# Patient Record
Sex: FEMALE | Race: BLACK OR AFRICAN AMERICAN | NOT HISPANIC OR LATINO | Employment: OTHER | URBAN - METROPOLITAN AREA
[De-identification: names, ages, dates, MRNs, and addresses within clinical notes are randomized per-mention and may not be internally consistent; named-entity substitution may affect disease eponyms.]

---

## 2018-07-24 PROBLEM — H40.053 OCULAR HYPERTENSION, BILATERAL: Status: ACTIVE | Noted: 2018-07-24

## 2018-12-12 ENCOUNTER — OUTSIDE PLACE OF SERVICE (OUTPATIENT)
Dept: CARDIOLOGY | Facility: CLINIC | Age: 68
End: 2018-12-12
Payer: MEDICARE

## 2018-12-12 PROCEDURE — 93010 ELECTROCARDIOGRAM REPORT: CPT | Mod: S$GLB,,, | Performed by: INTERNAL MEDICINE

## 2019-06-20 ENCOUNTER — TELEPHONE (OUTPATIENT)
Dept: ADMINISTRATIVE | Facility: HOSPITAL | Age: 69
End: 2019-06-20

## 2019-11-27 ENCOUNTER — HOSPITAL ENCOUNTER (EMERGENCY)
Facility: HOSPITAL | Age: 69
Discharge: HOME | End: 2019-11-27
Attending: SURGERY
Payer: MEDICAID

## 2019-11-27 VITALS
RESPIRATION RATE: 18 BRPM | BODY MASS INDEX: 29.16 KG/M2 | HEART RATE: 75 BPM | TEMPERATURE: 98 F | SYSTOLIC BLOOD PRESSURE: 140 MMHG | HEIGHT: 65 IN | WEIGHT: 175 LBS | OXYGEN SATURATION: 98 % | DIASTOLIC BLOOD PRESSURE: 89 MMHG
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
## 2019-11-27 DIAGNOSIS — S93.401A SPRAIN OF RIGHT ANKLE, UNSPECIFIED LIGAMENT, INITIAL ENCOUNTER: ICD-10-CM

## 2019-11-27 DIAGNOSIS — S83.91XA SPRAIN OF RIGHT KNEE, UNSPECIFIED LIGAMENT, INITIAL ENCOUNTER: Primary | ICD-10-CM

## 2019-11-27 PROCEDURE — 99283 EMERGENCY DEPT VISIT LOW MDM: CPT | Mod: 25,ER

## 2019-11-27 NOTE — ED PROVIDER NOTES
Encounter Date: 2019       History     Chief Complaint   Patient presents with    Fall     Pt tripped over walker and fell on ceramic tile. c/o right ankle, knee, elbow, and back pain. Also c/o headache.      69-year-old female presents to the emergency department for evaluation of acute left ankle and right knee pain status post mechanical trip and fall.  She reports that she was holding her 8-month-old grandchild when she tripped over 1 of the child's toy is an fell.  She reports that she fell onto ceramic tile.  She reports that she twisted her left ankle, landed on her right knee in hit the right side of her body upon impact.  She reports that she did not hit her head nor lose consciousness.  She denies any headache, dizziness, vision changes, neck pain, chest pain, abdominal pain, nausea, vomiting or diarrhea.  No treatment was attempted prior to arrival.  She reports that she was currently placed on Mobic for shoulder injury status post trip and fall 2 months ago.  She reports that she is on aspirin but no other blood thinning medications.        Review of patient's allergies indicates:   Allergen Reactions    Codeine sulfate Hives    Darvocet a500 [propoxyphene n-acetaminophen] Other (See Comments)    Darvon [propoxyphene] Other (See Comments)     hallucinations    Sulfa (sulfonamide antibiotics) Hives     Past Medical History:   Diagnosis Date    Arthritis     Cataract     Dry eye     Hypertension      Past Surgical History:   Procedure Laterality Date    ANTERIOR CRUCIATE LIGAMENT REPAIR      APPENDECTOMY      CATARACT EXTRACTION Right      SECTION      HYSTERECTOMY  age 36    d/t uterine fibroids.    KNEE ARTHROSCOPY Right 06/15/2017    per pt    OOPHORECTOMY Right      Family History   Problem Relation Age of Onset    Arthritis Mother     Diabetes Mother     Heart disease Mother     Hyperlipidemia Mother     Cancer Father         Colon    Diabetes Sister     Heart  disease Sister     Hyperlipidemia Sister      Social History     Tobacco Use    Smoking status: Never Smoker    Smokeless tobacco: Never Used   Substance Use Topics    Alcohol use: No     Alcohol/week: 0.0 standard drinks    Drug use: No     Review of Systems   Constitutional: Negative for activity change, chills and fever.   HENT: Negative for congestion, rhinorrhea, sinus pain, sore throat, trouble swallowing and voice change.    Eyes: Negative for photophobia and visual disturbance.   Respiratory: Negative for cough and shortness of breath.    Cardiovascular: Negative for chest pain.   Gastrointestinal: Negative for abdominal pain, constipation, diarrhea, nausea and vomiting.   Genitourinary: Negative for decreased urine volume, dysuria, frequency and hematuria.   Musculoskeletal: Positive for arthralgias. Negative for back pain, joint swelling, neck pain and neck stiffness.   Skin: Negative for rash.   Neurological: Negative for dizziness, syncope, weakness, light-headedness, numbness and headaches.       Physical Exam     Initial Vitals [11/27/19 1532]   BP Pulse Resp Temp SpO2   (!) 140/89 75 18 98.3 °F (36.8 °C) 98 %      MAP       --         Physical Exam    Nursing note and vitals reviewed.  Constitutional: She appears well-developed and well-nourished. She is not diaphoretic. No distress.   HENT:   Head: Normocephalic and atraumatic.   Right Ear: External ear normal.   Left Ear: External ear normal.   Nose: Nose normal.   Mouth/Throat: Oropharynx is clear and moist.   Eyes: Conjunctivae and EOM are normal. Pupils are equal, round, and reactive to light.   Neck: Normal range of motion. Neck supple.   Cardiovascular: Normal rate, regular rhythm and normal heart sounds.   Pulmonary/Chest: Breath sounds normal. No respiratory distress. She has no wheezes. She has no rhonchi. She has no rales. She exhibits no tenderness.   Abdominal: Soft. There is no tenderness.   Musculoskeletal:        Right hip: She  exhibits normal range of motion, no tenderness and no bony tenderness.        Left hip: She exhibits normal range of motion, no tenderness and no bony tenderness.        Right knee: She exhibits bony tenderness. She exhibits normal range of motion, no swelling and no effusion. Tenderness found.        Left knee: She exhibits normal range of motion, no swelling, no effusion and no bony tenderness. No tenderness found.        Right ankle: She exhibits normal range of motion, no swelling and no ecchymosis.        Left ankle: She exhibits normal range of motion, no swelling and no ecchymosis. Tenderness. Lateral malleolus tenderness found.   Neurological: She is alert and oriented to person, place, and time.   Skin: Skin is warm and dry.   Psychiatric: She has a normal mood and affect.         ED Course   Procedures  Labs Reviewed - No data to display       Imaging Results          X-Ray Ankle Complete Left (Final result)  Result time 11/27/19 16:11:04    Final result by Aron Garrido III, MD (11/27/19 16:11:04)                 Impression:      No acute osseous abnormality identified.      Electronically signed by: Aron Garrido MD  Date:    11/27/2019  Time:    16:11             Narrative:    EXAMINATION:  XR ANKLE COMPLETE 3 VIEW LEFT    CLINICAL HISTORY:  Fall on same level from slipping, tripping and stumbling without subsequent striking against object, initial encounter; left ankle injury    FINDINGS:  No fracture is identified.  Joint alignment is anatomic.  No severe arthritic change identified.  Calcaneal tuberosity enthesophytes are noted.  There are several calcified phleboliths in the soft tissues of the ankle and lower leg.  There is mild soft tissue swelling in the ankle.                               X-Ray Knee 1 or 2 View Right (Final result)  Result time 11/27/19 16:11:58    Final result by Lefty Montgomery Jr., MD (11/27/19 16:11:58)                 Impression:      No acute findings.  Osteoarthritis  most severe medially with near bone-on-bone articulation.      Electronically signed by: Lefty Montgomery Jr., MD  Date:    11/27/2019  Time:    16:11             Narrative:    EXAMINATION:  XR KNEE 1 OR 2 VIEW RIGHT    CLINICAL HISTORY:  Fall on same level from slipping, tripping and stumbling without subsequent striking against object, initial encounter    TECHNIQUE:  AP and lateral views of the right knee were performed.    COMPARISON:  None    FINDINGS:  Normal alignment with no acute fracture.  Tricompartmental degenerative joint space narrowing most pronounced medially with near bone-on-bone articulation.  Large marginal osteophytes.  There are also osteophytes at the margins of the patella.  Small suprapatellar effusion.                                 Medical Decision Making:   Initial Assessment:   69-year-old female presents to the emergency department for left ankle and right knee pain status post mechanical trip and fall.  Physical exam reveals a nontoxic-appearing female in no acute distress. Patient is afebrile vital signs within normal limits. Neurological exam reveals an alert and oriented patient.  No evidence of head injury noted.  Neck is supple, nontender to palpation.  Examination of the right lower extremity reveals mild tenderness to palpation noted over the anterior aspect of the right knee.  Examination of the left lower extremity reveals mild tenderness to palpation noted over the lateral malleolus of the left ankle.  No erythema, edema or ecchymosis noted.  Full range of motion, sensation and peripheral pulses intact in lower extremities bilaterally.  Differential Diagnosis:   X-ray of the knee and ankle ordered to assess possible osseous injury including fracture or dislocation  Knee sprain  Ankle sprain    ED Management:  X-ray report of the ankle reveals no acute .  X-ray report of the reveals no acute findings.  These findings were discussed with the patient verbalizes understanding and  agreement course treatment.  Instructed the patient to follow up with her primary care provider for re-evaluation and to return to the emergency department immediately for any new or worsening symptoms.                                 Clinical Impression:       ICD-10-CM ICD-9-CM   1. Sprain of right knee, unspecified ligament, initial encounter S83.91XA 844.9   2. Fall from slip, trip, or stumble, initial encounter W01.0XXA E885.9   3. Sprain of right ankle, unspecified ligament, initial encounter S93.401A 845.00                             Barbara Bernardo PA-C  11/27/19 9481

## 2019-11-27 NOTE — DISCHARGE INSTRUCTIONS
The x-rays of your knee and ankle did not reveal any evidence of fracture or dislocation.  you are advised to rest, elevate and ice the joints.  You are instructed to follow up with your primary care provider for re-evaluation and to return to the emergency department immediately for any new or worsening symptoms

## 2021-07-01 ENCOUNTER — PATIENT MESSAGE (OUTPATIENT)
Dept: ADMINISTRATIVE | Facility: OTHER | Age: 71
End: 2021-07-01

## 2022-04-28 ENCOUNTER — OUTSIDE PLACE OF SERVICE (OUTPATIENT)
Dept: CARDIOLOGY | Facility: CLINIC | Age: 72
End: 2022-04-28
Payer: MEDICAID

## 2022-04-28 PROCEDURE — 93010 PR ELECTROCARDIOGRAM REPORT: ICD-10-PCS | Mod: ,,, | Performed by: INTERNAL MEDICINE

## 2022-04-28 PROCEDURE — 93010 ELECTROCARDIOGRAM REPORT: CPT | Mod: ,,, | Performed by: INTERNAL MEDICINE

## 2022-09-30 RX ORDER — DIAZEPAM 5 MG/1
5 TABLET ORAL
Qty: 1 TABLET | Refills: 0 | Status: SHIPPED | OUTPATIENT
Start: 2022-09-30 | End: 2023-02-16

## 2023-03-14 DIAGNOSIS — Z12.31 ENCOUNTER FOR SCREENING MAMMOGRAM FOR BREAST CANCER: Primary | ICD-10-CM

## 2023-04-03 ENCOUNTER — PATIENT MESSAGE (OUTPATIENT)
Dept: ADMINISTRATIVE | Facility: HOSPITAL | Age: 73
End: 2023-04-03
Payer: MEDICAID

## 2023-05-16 ENCOUNTER — PATIENT OUTREACH (OUTPATIENT)
Dept: ADMINISTRATIVE | Facility: HOSPITAL | Age: 73
End: 2023-05-16
Payer: MEDICAID

## 2023-05-18 ENCOUNTER — PATIENT OUTREACH (OUTPATIENT)
Dept: ADMINISTRATIVE | Facility: HOSPITAL | Age: 73
End: 2023-05-18
Payer: MEDICAID

## 2023-07-25 PROBLEM — I45.10 RIGHT BUNDLE BRANCH BLOCK: Status: ACTIVE | Noted: 2023-07-25

## 2023-07-25 PROBLEM — R07.89 CHEST PAIN, ATYPICAL: Status: ACTIVE | Noted: 2023-07-25

## 2023-07-25 PROBLEM — Z82.49 FAMILY HISTORY OF CORONARY ARTERY DISEASE: Status: ACTIVE | Noted: 2023-07-25

## 2023-10-17 ENCOUNTER — PATIENT OUTREACH (OUTPATIENT)
Dept: ADMINISTRATIVE | Facility: HOSPITAL | Age: 73
End: 2023-10-17
Payer: MEDICARE

## 2023-10-17 NOTE — PROGRESS NOTES
Attempted to contact pt in reference to the BP gap report. Unable to contact. No answer message left.

## 2023-12-18 ENCOUNTER — PATIENT MESSAGE (OUTPATIENT)
Dept: ADMINISTRATIVE | Facility: HOSPITAL | Age: 73
End: 2023-12-18
Payer: MEDICAID

## 2023-12-19 ENCOUNTER — HOSPITAL ENCOUNTER (EMERGENCY)
Facility: HOSPITAL | Age: 73
Discharge: HOME | End: 2023-12-19
Attending: EMERGENCY MEDICINE
Payer: MEDICAID

## 2023-12-19 VITALS
WEIGHT: 170 LBS | OXYGEN SATURATION: 100 % | HEART RATE: 72 BPM | SYSTOLIC BLOOD PRESSURE: 167 MMHG | RESPIRATION RATE: 18 BRPM | BODY MASS INDEX: 28.29 KG/M2 | DIASTOLIC BLOOD PRESSURE: 85 MMHG | TEMPERATURE: 99 F

## 2023-12-19 DIAGNOSIS — J06.9 UPPER RESPIRATORY INFECTION, VIRAL: Primary | ICD-10-CM

## 2023-12-19 DIAGNOSIS — M94.0 COSTOCHONDRITIS, ACUTE: ICD-10-CM

## 2023-12-19 LAB
ANION GAP SERPL CALC-SCNC: 12 MMOL/L (ref 8–16)
BASOPHILS # BLD AUTO: 0.05 K/UL (ref 0–0.2)
BASOPHILS NFR BLD: 0.8 % (ref 0–1.9)
CALCIUM SERPL-MCNC: 9.2 MG/DL (ref 8.7–10.5)
CHLORIDE SERPL-SCNC: 100 MMOL/L (ref 95–110)
CO2 SERPL-SCNC: 27 MMOL/L (ref 23–29)
CREAT SERPL-MCNC: 1.01 MG/DL (ref 0.5–1.4)
DIFFERENTIAL METHOD: ABNORMAL
EOSINOPHIL # BLD AUTO: 0 K/UL (ref 0–0.5)
EOSINOPHIL NFR BLD: 0.2 % (ref 0–8)
ERYTHROCYTE [DISTWIDTH] IN BLOOD BY AUTOMATED COUNT: 13.4 % (ref 11.5–14.5)
EST. GFR  (NO RACE VARIABLE): 59.5 ML/MIN/1.73 M^2
GLUCOSE SERPL-MCNC: 106 MG/DL (ref 70–110)
GROUP A STREP, MOLECULAR: NEGATIVE
HCT VFR BLD AUTO: 43.8 % (ref 37–48.5)
HGB BLD-MCNC: 14.7 G/DL (ref 12–16)
IMM GRANULOCYTES # BLD AUTO: 0.04 K/UL (ref 0–0.04)
IMM GRANULOCYTES NFR BLD AUTO: 0.6 % (ref 0–0.5)
INFLUENZA A, MOLECULAR: NEGATIVE
INFLUENZA B, MOLECULAR: NEGATIVE
LYMPHOCYTES # BLD AUTO: 1.6 K/UL (ref 1–4.8)
LYMPHOCYTES NFR BLD: 25.4 % (ref 18–48)
MCH RBC QN AUTO: 28.6 PG (ref 27–31)
MCHC RBC AUTO-ENTMCNC: 33.6 G/DL (ref 32–36)
MCV RBC AUTO: 85 FL (ref 82–98)
MONOCYTES # BLD AUTO: 1.2 K/UL (ref 0.3–1)
MONOCYTES NFR BLD: 19 % (ref 4–15)
NEUTROPHILS # BLD AUTO: 3.4 K/UL (ref 1.8–7.7)
NEUTROPHILS NFR BLD: 54 % (ref 38–73)
NRBC BLD-RTO: 0 /100 WBC
PLATELET # BLD AUTO: 293 K/UL (ref 150–450)
PMV BLD AUTO: 9.8 FL (ref 9.2–12.9)
POTASSIUM SERPL-SCNC: 3.7 MMOL/L (ref 3.5–5.1)
RBC # BLD AUTO: 5.14 M/UL (ref 4–5.4)
SARS-COV-2 RDRP RESP QL NAA+PROBE: NEGATIVE
SODIUM SERPL-SCNC: 139 MMOL/L (ref 136–145)
SPECIMEN SOURCE: NORMAL
TROPONIN I SERPL-MCNC: 0.01 NG/ML (ref 0.01–0.03)
UUN UR-MCNC: 15 MG/DL (ref 7–17)
WBC # BLD AUTO: 6.37 K/UL (ref 3.9–12.7)

## 2023-12-19 PROCEDURE — 84484 ASSAY OF TROPONIN QUANT: CPT | Mod: ER | Performed by: EMERGENCY MEDICINE

## 2023-12-19 PROCEDURE — 94640 AIRWAY INHALATION TREATMENT: CPT | Mod: ER

## 2023-12-19 PROCEDURE — 87502 INFLUENZA DNA AMP PROBE: CPT | Mod: ER | Performed by: EMERGENCY MEDICINE

## 2023-12-19 PROCEDURE — 85025 COMPLETE CBC W/AUTO DIFF WBC: CPT | Mod: ER | Performed by: EMERGENCY MEDICINE

## 2023-12-19 PROCEDURE — 25000003 PHARM REV CODE 250: Mod: ER | Performed by: EMERGENCY MEDICINE

## 2023-12-19 PROCEDURE — 87651 STREP A DNA AMP PROBE: CPT | Mod: ER | Performed by: EMERGENCY MEDICINE

## 2023-12-19 PROCEDURE — U0002 COVID-19 LAB TEST NON-CDC: HCPCS | Mod: ER | Performed by: EMERGENCY MEDICINE

## 2023-12-19 PROCEDURE — 93005 ELECTROCARDIOGRAM TRACING: CPT | Mod: ER

## 2023-12-19 PROCEDURE — 80048 BASIC METABOLIC PNL TOTAL CA: CPT | Mod: ER | Performed by: EMERGENCY MEDICINE

## 2023-12-19 PROCEDURE — 87502 INFLUENZA DNA AMP PROBE: CPT | Mod: ER

## 2023-12-19 PROCEDURE — 99285 EMERGENCY DEPT VISIT HI MDM: CPT | Mod: 25,ER

## 2023-12-19 PROCEDURE — 25000242 PHARM REV CODE 250 ALT 637 W/ HCPCS: Mod: ER | Performed by: EMERGENCY MEDICINE

## 2023-12-19 PROCEDURE — 63600175 PHARM REV CODE 636 W HCPCS: Mod: ER | Performed by: EMERGENCY MEDICINE

## 2023-12-19 PROCEDURE — 99900035 HC TECH TIME PER 15 MIN (STAT): Mod: ER

## 2023-12-19 PROCEDURE — 25500020 PHARM REV CODE 255: Mod: ER | Performed by: EMERGENCY MEDICINE

## 2023-12-19 PROCEDURE — 96374 THER/PROPH/DIAG INJ IV PUSH: CPT | Mod: ER

## 2023-12-19 RX ORDER — BENZONATATE 100 MG/1
200 CAPSULE ORAL
Status: COMPLETED | OUTPATIENT
Start: 2023-12-19 | End: 2023-12-19

## 2023-12-19 RX ORDER — DEXAMETHASONE SODIUM PHOSPHATE 4 MG/ML
4 INJECTION, SOLUTION INTRA-ARTICULAR; INTRALESIONAL; INTRAMUSCULAR; INTRAVENOUS; SOFT TISSUE
Status: COMPLETED | OUTPATIENT
Start: 2023-12-19 | End: 2023-12-19

## 2023-12-19 RX ORDER — PREDNISONE 20 MG/1
40 TABLET ORAL DAILY
Qty: 8 TABLET | Refills: 0 | Status: SHIPPED | OUTPATIENT
Start: 2023-12-19 | End: 2023-12-23

## 2023-12-19 RX ORDER — ALBUTEROL SULFATE 90 UG/1
1-2 AEROSOL, METERED RESPIRATORY (INHALATION) EVERY 6 HOURS PRN
Qty: 8 G | Refills: 0 | Status: SHIPPED | OUTPATIENT
Start: 2023-12-19 | End: 2024-12-18

## 2023-12-19 RX ORDER — IPRATROPIUM BROMIDE AND ALBUTEROL SULFATE 2.5; .5 MG/3ML; MG/3ML
3 SOLUTION RESPIRATORY (INHALATION)
Status: COMPLETED | OUTPATIENT
Start: 2023-12-19 | End: 2023-12-19

## 2023-12-19 RX ADMIN — IOHEXOL 100 ML: 350 INJECTION, SOLUTION INTRAVENOUS at 02:12

## 2023-12-19 RX ADMIN — BENZONATATE 200 MG: 100 CAPSULE ORAL at 12:12

## 2023-12-19 RX ADMIN — DEXAMETHASONE SODIUM PHOSPHATE 4 MG: 4 INJECTION, SOLUTION INTRA-ARTICULAR; INTRALESIONAL; INTRAMUSCULAR; INTRAVENOUS; SOFT TISSUE at 12:12

## 2023-12-19 RX ADMIN — IPRATROPIUM BROMIDE AND ALBUTEROL SULFATE 3 ML: .5; 3 SOLUTION RESPIRATORY (INHALATION) at 12:12

## 2023-12-19 NOTE — ED PROVIDER NOTES
Chief Complaint:  Cough, congestion, body aches, shortness of breath and chest pain    History of Present Illness:    [place] 73 y.o. with a  has a past medical history of Arthritis, Cataract, Dry eye, and Hypertension. who presents to the emergency department today with a complaint of Cough, congestion, body aches, shortness of breath and chest pain.  Patient reports that this has been ongoing since Saturday.  She has chest pain when she coughs.  She gets short of breath when she coughs.  She denies any sputum production.  She has all over body aches.  Slight sore throat.  No headache.  No vomiting.  No diarrhea.        ROS    Constitutional: No fever, no chills.  Gastrointestinal: No abdominal pain, no vomiting. No diarrhea.  Genitourinary: No hematuria, dysuria, urgency.  Musculoskeletal: No back pain.     Otherwise remaining ROS negative     The history is provided by the patient      Reviewed and verified by myself:   PMH/PSH/SOC/FH REVIEWED :    Past Medical History:   Diagnosis Date    Arthritis     Cataract     Dry eye     Hypertension        Past Surgical History:   Procedure Laterality Date    ANTERIOR CRUCIATE LIGAMENT REPAIR      APPENDECTOMY      CATARACT EXTRACTION Right      SECTION      HYSTERECTOMY  age 36    d/t uterine fibroids.    KNEE ARTHROSCOPY Right 06/15/2017    per pt    OOPHORECTOMY Right        Social History     Socioeconomic History    Marital status:     Years of education: 15   Tobacco Use    Smoking status: Never    Smokeless tobacco: Never   Substance and Sexual Activity    Alcohol use: No     Alcohol/week: 0.0 standard drinks of alcohol    Drug use: No    Sexual activity: Never       Family History   Problem Relation Age of Onset    Arthritis Mother     Diabetes Mother     Heart disease Mother     Hyperlipidemia Mother     Cancer Father         Colon    Diabetes Sister     Heart disease Sister     Hyperlipidemia Sister                ALLERGIES  REVIEWED  Review of patient's allergies indicates:   Allergen Reactions    Codeine sulfate Hives    Darvocet a500 [propoxyphene n-acetaminophen] Other (See Comments)    Darvon [propoxyphene] Other (See Comments)     hallucinations    Sulfa (sulfonamide antibiotics) Hives    Sulfamethoxazole-trimethoprim        MEDICATIONS REVIEWED  Medication List with Changes/Refills   New Medications    ALBUTEROL (PROVENTIL/VENTOLIN HFA) 90 MCG/ACTUATION INHALER    Inhale 1-2 puffs into the lungs every 6 (six) hours as needed for Wheezing or Shortness of Breath. Rescue    PREDNISONE (DELTASONE) 20 MG TABLET    Take 2 tablets (40 mg total) by mouth once daily. for 4 days   Current Medications    ASPIRIN (ECOTRIN) 81 MG EC TABLET    Take 81 mg by mouth once daily.    LATANOPROST (XALATAN) 0.005 % OPHTHALMIC SOLUTION    Place 1 drop into both eyes once daily.    LEVOTHYROXINE (EUTHYROX) 75 MCG TABLET    Take 1 tablet (75 mcg total) by mouth before breakfast.    LISINOPRIL-HYDROCHLOROTHIAZIDE (PRINZIDE,ZESTORETIC) 20-12.5 MG PER TABLET    Take 1 tablet by mouth once daily.    METOPROLOL TARTRATE (LOPRESSOR) 50 MG TABLET    Take 1 tablet (50 mg total) by mouth 2 (two) times daily.    SOD SULF-POT CHLORIDE-MAG SULF (SUTAB) 1.479-0.188- 0.225 GRAM TABLET    Take 12 tablets by mouth once daily. Take according to package instructions with indicated amount of water.           VS reviewed    Nursing/Ancillary staff note reviewed.       Physical Exam     ED Triage Vitals [12/19/23 1047]   BP (!) 177/88   Pulse 78   Resp 20   Temp 98.9 °F (37.2 °C)   SpO2 100 %       Physical Exam    Constitutional: The patient is alert, has no immediate need for airway protection and no signs of toxicity. No acute distress. Lying in bed but able to sit up without difficulty.   Eyes: Pupils equal and round no pallor or injection. Extra ocular movements intact. No drainage.   ENT: Mucous membranes are moist. Oropharynx clear.   Neck: Neck is supple non-tender.  No lymphadenopathy. No stridor.   Respiratory: There are no retractions, lungs show some slight wheezing. No crackles.   Cardiovascular: Regular rate and rhythm. No murmurs, rubs or gallops.  Gastrointestinal:  Abdomen is soft and non-tender, no masses, bowel sounds normal. No guarding, no rebound.  No pulsatile mass.   Neurological: Alert and oriented x 4. CN II-XII grossly intact. No focal weakness. Strength intact 5/5 bilaterally in upper and lower extremities.   Skin: Warm and dry, no rashes.  Musculoskeletal: Extremities are non-tender, non-swollen and have full range of motion. Back NTTP along the midline.   Psyc: Normal behavior. Linear thought content.          ED Course         ED Course as of 12/19/23 1626   Tue Dec 19, 2023   1258 Influenza A, Molecular: Negative [JA]   1258 Influenza B, Molecular: Negative [JA]   1258 SARS-CoV-2 RNA, Amplification, Qual: Negative [JA]   1258 Group A Strep, Molecular: Negative  CBC unremarkable, BMP unremarkable [JA]   1332 Troponin I: 0.013  Normal [JA]   1332 CBC unremarkable, BMP unremarkable [JA]      ED Course User Index  [JA] Kellee Figueroa MD            ED Management:            Medical Decision Making  Differential diagnosis included but not limited to : bronchitis, URI, cough, laryngitis, tracheitis, asthma, sinusitis, pneumonia, viral, COPD, medication side effect.      Initial: This is a 73 y.o. female  with  has a past medical history of Arthritis, Cataract, Dry eye, and Hypertension. who comes in for emergent evaluation of a new, acute, complicated and undiagnosed problem of cough, congestion and viral like symptoms. On examination vitals are stable. On physical exam notable for some dry cough, slight wheezing on physical exam oxygenating appropriately on room air.   Their exam is normal otherwise.     Orders I ordered to further evaluate included:  Flu, COVID, CBC, BMP, troponin, chest x-ray    Comorbidity impacting this encounter includes:   Hypertension    Problems Addressed:  Costochondritis, acute: complicated acute illness or injury  Upper respiratory infection, viral: complicated acute illness or injury with systemic symptoms    Amount and/or Complexity of Data Reviewed  External Data Reviewed: ECG and notes.     Details: Patient was seen by her cardiologist 07/25/2023.  For right bundle-branch block, follow up of chest pain.    EKG from 07/25/2023 similar to today's      Labs: ordered. Decision-making details documented in ED Course.  Radiology: ordered and independent interpretation performed.     Details: Chest x-ray without consolidation  ECG/medicine tests: ordered and independent interpretation performed.     Details: 78 beats per minute, normal sinus rhythm, right bundle-branch block, no STEMI similar to previous 07/25/2023    Risk  OTC drugs.  Prescription drug management.        After consideration of the pts current home medications, the decision is made to maintain the current medication regimen.      Will start :  New Prescriptions    ALBUTEROL (PROVENTIL/VENTOLIN HFA) 90 MCG/ACTUATION INHALER    Inhale 1-2 puffs into the lungs every 6 (six) hours as needed for Wheezing or Shortness of Breath. Rescue    PREDNISONE (DELTASONE) 20 MG TABLET    Take 2 tablets (40 mg total) by mouth once daily. for 4 days         OTC products such as tylenol or ibuprofen discussed for supplemental symptom control.     Pt received the following in the ED:   Medications   benzonatate capsule 200 mg (200 mg Oral Given 12/19/23 1201)   dexAMETHasone injection 4 mg (4 mg Intravenous Given 12/19/23 1224)   albuterol-ipratropium 2.5 mg-0.5 mg/3 mL nebulizer solution 3 mL (3 mLs Nebulization Given 12/19/23 1220)   iohexoL (OMNIPAQUE 350) injection 100 mL (100 mLs Intravenous Given 12/19/23 1412)           MDM continued:     [place]  presents to the emergency Department today with cough, upper respiratory symptoms along with some costochondritis.  Patient's  workup today is reassuring.  EKG without any acute changes.  Chest x-ray without signs of consolidation, pneumothorax, CTA without PE,  flu, COVID negative, CBC without significant leukocytosis, no significant metabolic abnormality.  At this time there is no need for admission.  Likely has a viral etiology which will need to run its course.  I discussed this with the patient.  There is no need for antibiotics at this time given that is likely viral.  She understands.  We discussed symptom control in the form of over-the-counter products, I will place her on prednisone given she had some wheezing and an albuterol inhaler as well.  She will follow up with her PCP.    The pt is comfortable with this plan and comfortable going home at this time. After taking into careful account the historical factors and physical exam findings of the patient's presentation today, in conjunction with the empirical and objective data obtained on ED workup, no acute emergent medical condition requiring admission has been identified. The patient appears to be low risk for an emergent medical condition and I feel it is safe and appropriate at this time for the patient to be discharged to follow-up as detailed in their discharge instructions for reevaluation and possible continued outpatient workup and management. Regardless, an unremarkable evaluation in the ED does not preclude the development or presence of a serious or life threatening condition. As such, patient was instructed to return immediately for any worsening or change in current symptoms. Precautions for return discussed at length.  Discharge and follow-up instructions discussed with the patient who expressed understanding and willingness to comply with my recommendations.    Voice recognition software utilized in this note.      Procedures          Impression      The primary encounter diagnosis was Upper respiratory infection, viral. A diagnosis of Costochondritis, acute was  also pertinent to this visit.            New Prescriptions    ALBUTEROL (PROVENTIL/VENTOLIN HFA) 90 MCG/ACTUATION INHALER    Inhale 1-2 puffs into the lungs every 6 (six) hours as needed for Wheezing or Shortness of Breath. Rescue    PREDNISONE (DELTASONE) 20 MG TABLET    Take 2 tablets (40 mg total) by mouth once daily. for 4 days        Follow-up Information       Margaret Hawthorne, NP. Schedule an appointment as soon as possible for a visit in 1 week.    Specialty: Internal Medicine  Contact information:  [address] [facility] [address]  [phone]               [facility] - Emergency Dept.    Specialty: Emergency Medicine  Why: If symptoms worsen  Contact information:  [address] [facility] [address] [phone]                                      Kellee Figueroa MD  12/19/23 3041

## 2023-12-19 NOTE — ED NOTES
"PT presents to the ED with C/O cough, congestions, body aches and "a little" SOB x Saturday. Reports chest pain that worsens with cough x Sunday. Afebrile. VSS .AAOx4. Reports grand kids are home sick with similar symptoms.   "

## 2023-12-19 NOTE — DISCHARGE INSTRUCTIONS
Your have a Upper Respiratory Infection, your chest xray does not show signs of pneumonia. Cough from an URI can linger for many weeks. Take your medications as prescribed.     Rest and stay hydrated.     Below are suggestions for symptomatic relief:              -Tylenol every 6 hours OR ibuprofen every 6 hours as needed for pain/fever. You can alternate between tylenol and ibuprofen.               -Salt water gargles to soothe throat pain.              -Chloroseptic spray also helps to numb throat pain.              -Nasal saline spray reduces inflammation and dryness.              -Warm face compresses to help with facial sinus pain/pressure.              -Vicks vapor rub at night.              -Flonase OTC or Nasacort OTC for nasal congestion.              -Delsym helps with coughing at night              -Zyrtec/Claritin during the day & Benadryl at night may help with allergies.                If you DO NOT have Hypertension or any history of palpitations, it is ok to take over the counter Sudafed or Mucinex D or Allegra-D or Claritin-D or Zyrtec-D.  If you do take one of the above, it is ok to combine that with plain over the counter Mucinex or Allegra or Claritin or Zyrtec. If, for example, you are taking Zyrtec -D, you can combine that with Mucinex, but not Mucinex-D.  If you are taking Mucinex-D, you can combine that with plain Allegra or Claritin or Zyrtec.   If you DO have Hypertension or palpitations, it is safe to take Coricidin HBP for relief of sinus symptoms.     Please follow up with your primary care provider within 5-7 days if your signs and symptoms have not resolved or worsen.      If your condition worsens or fails to improve we recommend that you receive another evaluation at the emergency room immediately or contact your primary medical clinic to discuss your concerns.

## 2023-12-29 ENCOUNTER — HOSPITAL ENCOUNTER (INPATIENT)
Facility: HOSPITAL | Age: 73
LOS: 3 days | Discharge: HOME | DRG: 194 | End: 2024-01-01
Attending: EMERGENCY MEDICINE | Admitting: STUDENT IN AN ORGANIZED HEALTH CARE EDUCATION/TRAINING PROGRAM
Payer: MEDICARE

## 2023-12-29 DIAGNOSIS — J10.1 INFLUENZA B: ICD-10-CM

## 2023-12-29 DIAGNOSIS — R09.02 HYPOXIA: ICD-10-CM

## 2023-12-29 DIAGNOSIS — R07.9 CHEST PAIN: ICD-10-CM

## 2023-12-29 DIAGNOSIS — J18.9 PNEUMONIA OF RIGHT LOWER LOBE DUE TO INFECTIOUS ORGANISM: Primary | ICD-10-CM

## 2023-12-29 PROBLEM — D75.839 THROMBOCYTOSIS: Status: ACTIVE | Noted: 2023-12-29

## 2023-12-29 PROBLEM — E87.1 HYPONATREMIA: Status: ACTIVE | Noted: 2023-12-29

## 2023-12-29 LAB
ALBUMIN SERPL BCP-MCNC: 4.1 G/DL (ref 3.5–5.2)
ALP SERPL-CCNC: 80 U/L (ref 38–126)
ALT SERPL W/O P-5'-P-CCNC: 22 U/L (ref 10–44)
ANION GAP SERPL CALC-SCNC: 13 MMOL/L (ref 8–16)
AST SERPL-CCNC: 28 U/L (ref 15–46)
BASOPHILS # BLD AUTO: 0.09 K/UL (ref 0–0.2)
BASOPHILS NFR BLD: 0.4 % (ref 0–1.9)
BILIRUB SERPL-MCNC: 0.8 MG/DL (ref 0.1–1)
BILIRUB UR QL STRIP: NEGATIVE
CALCIUM SERPL-MCNC: 9.3 MG/DL (ref 8.7–10.5)
CHLORIDE SERPL-SCNC: 96 MMOL/L (ref 95–110)
CLARITY UR REFRACT.AUTO: CLEAR
CO2 SERPL-SCNC: 26 MMOL/L (ref 23–29)
COLOR UR AUTO: YELLOW
CREAT SERPL-MCNC: 0.93 MG/DL (ref 0.5–1.4)
DIFFERENTIAL METHOD BLD: ABNORMAL
EOSINOPHIL # BLD AUTO: 0.1 K/UL (ref 0–0.5)
EOSINOPHIL NFR BLD: 0.5 % (ref 0–8)
ERYTHROCYTE [DISTWIDTH] IN BLOOD BY AUTOMATED COUNT: 13 % (ref 11.5–14.5)
EST. GFR  (NO RACE VARIABLE): >60 ML/MIN/1.73 M^2
GIANT PLATELETS BLD QL SMEAR: PRESENT
GLUCOSE SERPL-MCNC: 151 MG/DL (ref 70–110)
GLUCOSE UR QL STRIP: NEGATIVE
HCT VFR BLD AUTO: 40 % (ref 37–48.5)
HGB BLD-MCNC: 13.8 G/DL (ref 12–16)
HGB UR QL STRIP: ABNORMAL
IMM GRANULOCYTES # BLD AUTO: 0.41 K/UL (ref 0–0.04)
IMM GRANULOCYTES NFR BLD AUTO: 1.7 % (ref 0–0.5)
INFLUENZA A, MOLECULAR: NEGATIVE
INFLUENZA B, MOLECULAR: POSITIVE
KETONES UR QL STRIP: NEGATIVE
LACTATE SERPL-SCNC: 1.4 MMOL/L (ref 0.5–2.2)
LEUKOCYTE ESTERASE UR QL STRIP: NEGATIVE
LYMPHOCYTES # BLD AUTO: 2.3 K/UL (ref 1–4.8)
LYMPHOCYTES NFR BLD: 9.7 % (ref 18–48)
MAGNESIUM SERPL-MCNC: 2 MG/DL (ref 1.6–2.6)
MCH RBC QN AUTO: 28.8 PG (ref 27–31)
MCHC RBC AUTO-ENTMCNC: 34.5 G/DL (ref 32–36)
MCV RBC AUTO: 83 FL (ref 82–98)
MICROSCOPIC COMMENT: ABNORMAL
MONOCYTES # BLD AUTO: 2.7 K/UL (ref 0.3–1)
MONOCYTES NFR BLD: 11.4 % (ref 4–15)
NEUTROPHILS # BLD AUTO: 18 K/UL (ref 1.8–7.7)
NEUTROPHILS NFR BLD: 76.3 % (ref 38–73)
NITRITE UR QL STRIP: NEGATIVE
NRBC BLD-RTO: 0 /100 WBC
NT-PROBNP SERPL-MCNC: 43 PG/ML (ref 5–900)
PH UR STRIP: 7 [PH] (ref 5–8)
PHOSPHATE SERPL-MCNC: 3.4 MG/DL (ref 2.7–4.5)
PLATELET # BLD AUTO: 580 K/UL (ref 150–450)
PLATELET BLD QL SMEAR: ABNORMAL
PMV BLD AUTO: 9.5 FL (ref 9.2–12.9)
POTASSIUM SERPL-SCNC: 3.5 MMOL/L (ref 3.5–5.1)
PROCALCITONIN SERPL IA-MCNC: 0.16 NG/ML
PROT SERPL-MCNC: 8.4 G/DL (ref 6–8.4)
PROT UR QL STRIP: NEGATIVE
RBC # BLD AUTO: 4.8 M/UL (ref 4–5.4)
RBC #/AREA URNS AUTO: 12 /HPF (ref 0–4)
SARS-COV-2 RDRP RESP QL NAA+PROBE: NEGATIVE
SODIUM SERPL-SCNC: 135 MMOL/L (ref 136–145)
SP GR UR STRIP: <=1.005 (ref 1–1.03)
SPECIMEN SOURCE: ABNORMAL
TROPONIN I SERPL-MCNC: <0.012 NG/ML (ref 0.01–0.03)
URN SPEC COLLECT METH UR: ABNORMAL
UROBILINOGEN UR STRIP-ACNC: NEGATIVE EU/DL
UUN UR-MCNC: 10 MG/DL (ref 7–17)
WBC # BLD AUTO: 23.64 K/UL (ref 3.9–12.7)

## 2023-12-29 PROCEDURE — 99900035 HC TECH TIME PER 15 MIN (STAT): Mod: ER

## 2023-12-29 PROCEDURE — 11000001 HC ACUTE MED/SURG PRIVATE ROOM

## 2023-12-29 PROCEDURE — 87502 INFLUENZA DNA AMP PROBE: CPT | Mod: ER | Performed by: EMERGENCY MEDICINE

## 2023-12-29 PROCEDURE — 96361 HYDRATE IV INFUSION ADD-ON: CPT | Mod: ER

## 2023-12-29 PROCEDURE — 85025 COMPLETE CBC W/AUTO DIFF WBC: CPT | Mod: ER | Performed by: EMERGENCY MEDICINE

## 2023-12-29 PROCEDURE — 87040 BLOOD CULTURE FOR BACTERIA: CPT | Mod: 59,ER | Performed by: EMERGENCY MEDICINE

## 2023-12-29 PROCEDURE — 84100 ASSAY OF PHOSPHORUS: CPT | Mod: ER | Performed by: EMERGENCY MEDICINE

## 2023-12-29 PROCEDURE — 84484 ASSAY OF TROPONIN QUANT: CPT | Mod: ER | Performed by: EMERGENCY MEDICINE

## 2023-12-29 PROCEDURE — 83605 ASSAY OF LACTIC ACID: CPT | Mod: ER | Performed by: EMERGENCY MEDICINE

## 2023-12-29 PROCEDURE — 83880 ASSAY OF NATRIURETIC PEPTIDE: CPT | Mod: ER | Performed by: EMERGENCY MEDICINE

## 2023-12-29 PROCEDURE — 96365 THER/PROPH/DIAG IV INF INIT: CPT | Mod: ER

## 2023-12-29 PROCEDURE — 94640 AIRWAY INHALATION TREATMENT: CPT | Mod: ER

## 2023-12-29 PROCEDURE — 84145 PROCALCITONIN (PCT): CPT | Mod: ER | Performed by: EMERGENCY MEDICINE

## 2023-12-29 PROCEDURE — 63600175 PHARM REV CODE 636 W HCPCS: Mod: ER | Performed by: EMERGENCY MEDICINE

## 2023-12-29 PROCEDURE — 27000221 HC OXYGEN, UP TO 24 HOURS: Mod: ER

## 2023-12-29 PROCEDURE — 83735 ASSAY OF MAGNESIUM: CPT | Mod: ER | Performed by: EMERGENCY MEDICINE

## 2023-12-29 PROCEDURE — 25000242 PHARM REV CODE 250 ALT 637 W/ HCPCS: Mod: ER | Performed by: EMERGENCY MEDICINE

## 2023-12-29 PROCEDURE — 25000003 PHARM REV CODE 250: Mod: ER | Performed by: EMERGENCY MEDICINE

## 2023-12-29 PROCEDURE — 99900035 HC TECH TIME PER 15 MIN (STAT)

## 2023-12-29 PROCEDURE — U0002 COVID-19 LAB TEST NON-CDC: HCPCS | Mod: ER | Performed by: EMERGENCY MEDICINE

## 2023-12-29 PROCEDURE — 81000 URINALYSIS NONAUTO W/SCOPE: CPT | Mod: ER | Performed by: EMERGENCY MEDICINE

## 2023-12-29 PROCEDURE — 80053 COMPREHEN METABOLIC PANEL: CPT | Mod: ER | Performed by: EMERGENCY MEDICINE

## 2023-12-29 PROCEDURE — 25000003 PHARM REV CODE 250: Performed by: STUDENT IN AN ORGANIZED HEALTH CARE EDUCATION/TRAINING PROGRAM

## 2023-12-29 PROCEDURE — 63600175 PHARM REV CODE 636 W HCPCS: Performed by: STUDENT IN AN ORGANIZED HEALTH CARE EDUCATION/TRAINING PROGRAM

## 2023-12-29 PROCEDURE — 93010 ELECTROCARDIOGRAM REPORT: CPT | Mod: ,,, | Performed by: INTERNAL MEDICINE

## 2023-12-29 PROCEDURE — 27000207 HC ISOLATION

## 2023-12-29 PROCEDURE — 94761 N-INVAS EAR/PLS OXIMETRY MLT: CPT | Mod: ER

## 2023-12-29 PROCEDURE — 93005 ELECTROCARDIOGRAM TRACING: CPT | Mod: ER

## 2023-12-29 PROCEDURE — 96367 TX/PROPH/DG ADDL SEQ IV INF: CPT | Mod: ER

## 2023-12-29 PROCEDURE — 99285 EMERGENCY DEPT VISIT HI MDM: CPT | Mod: 25,ER

## 2023-12-29 RX ORDER — ONDANSETRON 2 MG/ML
4 INJECTION INTRAMUSCULAR; INTRAVENOUS EVERY 8 HOURS PRN
Status: DISCONTINUED | OUTPATIENT
Start: 2023-12-29 | End: 2024-01-01 | Stop reason: HOSPADM

## 2023-12-29 RX ORDER — OSELTAMIVIR PHOSPHATE 75 MG/1
75 CAPSULE ORAL
Status: COMPLETED | OUTPATIENT
Start: 2023-12-29 | End: 2023-12-29

## 2023-12-29 RX ORDER — METOPROLOL TARTRATE 50 MG/1
50 TABLET ORAL 2 TIMES DAILY
Status: DISCONTINUED | OUTPATIENT
Start: 2023-12-29 | End: 2024-01-01 | Stop reason: HOSPADM

## 2023-12-29 RX ORDER — ASPIRIN 81 MG/1
81 TABLET ORAL DAILY
Status: CANCELLED | OUTPATIENT
Start: 2023-12-29

## 2023-12-29 RX ORDER — POLYETHYLENE GLYCOL 3350 17 G/17G
17 POWDER, FOR SOLUTION ORAL DAILY
Status: DISCONTINUED | OUTPATIENT
Start: 2023-12-30 | End: 2024-01-01 | Stop reason: HOSPADM

## 2023-12-29 RX ORDER — IBUPROFEN 200 MG
24 TABLET ORAL
Status: DISCONTINUED | OUTPATIENT
Start: 2023-12-29 | End: 2024-01-01 | Stop reason: HOSPADM

## 2023-12-29 RX ORDER — METOPROLOL TARTRATE 50 MG/1
50 TABLET ORAL 2 TIMES DAILY
Status: CANCELLED | OUTPATIENT
Start: 2023-12-29

## 2023-12-29 RX ORDER — ACETAMINOPHEN 325 MG/1
650 TABLET ORAL EVERY 4 HOURS PRN
Status: DISCONTINUED | OUTPATIENT
Start: 2023-12-29 | End: 2024-01-01 | Stop reason: HOSPADM

## 2023-12-29 RX ORDER — IBUPROFEN 200 MG
16 TABLET ORAL
Status: DISCONTINUED | OUTPATIENT
Start: 2023-12-29 | End: 2024-01-01 | Stop reason: HOSPADM

## 2023-12-29 RX ORDER — LISINOPRIL AND HYDROCHLOROTHIAZIDE 12.5; 2 MG/1; MG/1
1 TABLET ORAL DAILY
Status: DISCONTINUED | OUTPATIENT
Start: 2023-12-30 | End: 2023-12-29 | Stop reason: RX

## 2023-12-29 RX ORDER — ACETAMINOPHEN 500 MG
1000 TABLET ORAL
Status: COMPLETED | OUTPATIENT
Start: 2023-12-29 | End: 2023-12-29

## 2023-12-29 RX ORDER — LISINOPRIL 20 MG/1
20 TABLET ORAL DAILY
Status: DISCONTINUED | OUTPATIENT
Start: 2023-12-30 | End: 2024-01-01 | Stop reason: HOSPADM

## 2023-12-29 RX ORDER — ASPIRIN 81 MG/1
81 TABLET ORAL DAILY
Status: DISCONTINUED | OUTPATIENT
Start: 2023-12-30 | End: 2024-01-01 | Stop reason: HOSPADM

## 2023-12-29 RX ORDER — SIMETHICONE 80 MG
1 TABLET,CHEWABLE ORAL 4 TIMES DAILY PRN
Status: DISCONTINUED | OUTPATIENT
Start: 2023-12-29 | End: 2024-01-01 | Stop reason: HOSPADM

## 2023-12-29 RX ORDER — HYDROCHLOROTHIAZIDE 12.5 MG/1
12.5 TABLET ORAL DAILY
Status: DISCONTINUED | OUTPATIENT
Start: 2023-12-30 | End: 2023-12-31

## 2023-12-29 RX ORDER — LEVOTHYROXINE SODIUM 75 UG/1
75 TABLET ORAL
Status: CANCELLED | OUTPATIENT
Start: 2023-12-30

## 2023-12-29 RX ORDER — ALBUTEROL SULFATE 2.5 MG/.5ML
2.5 SOLUTION RESPIRATORY (INHALATION)
Status: COMPLETED | OUTPATIENT
Start: 2023-12-29 | End: 2023-12-29

## 2023-12-29 RX ORDER — TALC
6 POWDER (GRAM) TOPICAL NIGHTLY PRN
Status: DISCONTINUED | OUTPATIENT
Start: 2023-12-29 | End: 2024-01-01 | Stop reason: HOSPADM

## 2023-12-29 RX ORDER — HEPARIN SODIUM 5000 [USP'U]/ML
5000 INJECTION, SOLUTION INTRAVENOUS; SUBCUTANEOUS EVERY 8 HOURS
Status: DISCONTINUED | OUTPATIENT
Start: 2023-12-29 | End: 2024-01-01 | Stop reason: HOSPADM

## 2023-12-29 RX ORDER — GLUCAGON 1 MG
1 KIT INJECTION
Status: DISCONTINUED | OUTPATIENT
Start: 2023-12-29 | End: 2024-01-01 | Stop reason: HOSPADM

## 2023-12-29 RX ORDER — BENZONATATE 100 MG/1
100 CAPSULE ORAL 3 TIMES DAILY PRN
Status: DISCONTINUED | OUTPATIENT
Start: 2023-12-29 | End: 2024-01-01 | Stop reason: HOSPADM

## 2023-12-29 RX ORDER — OSELTAMIVIR PHOSPHATE 75 MG/1
75 CAPSULE ORAL 2 TIMES DAILY
Status: DISCONTINUED | OUTPATIENT
Start: 2023-12-29 | End: 2024-01-01 | Stop reason: HOSPADM

## 2023-12-29 RX ORDER — LATANOPROST 50 UG/ML
1 SOLUTION/ DROPS OPHTHALMIC DAILY
Status: DISCONTINUED | OUTPATIENT
Start: 2023-12-30 | End: 2024-01-01 | Stop reason: HOSPADM

## 2023-12-29 RX ORDER — LEVOTHYROXINE SODIUM 75 UG/1
75 TABLET ORAL
Status: DISCONTINUED | OUTPATIENT
Start: 2023-12-30 | End: 2024-01-01 | Stop reason: HOSPADM

## 2023-12-29 RX ORDER — NALOXONE HCL 0.4 MG/ML
0.02 VIAL (ML) INJECTION
Status: DISCONTINUED | OUTPATIENT
Start: 2023-12-29 | End: 2024-01-01 | Stop reason: HOSPADM

## 2023-12-29 RX ORDER — SODIUM CHLORIDE 0.9 % (FLUSH) 0.9 %
10 SYRINGE (ML) INJECTION EVERY 12 HOURS PRN
Status: DISCONTINUED | OUTPATIENT
Start: 2023-12-29 | End: 2024-01-01 | Stop reason: HOSPADM

## 2023-12-29 RX ADMIN — ALBUTEROL SULFATE 2.5 MG: 2.5 SOLUTION RESPIRATORY (INHALATION) at 11:12

## 2023-12-29 RX ADMIN — METOPROLOL TARTRATE 50 MG: 50 TABLET, FILM COATED ORAL at 10:12

## 2023-12-29 RX ADMIN — Medication 6 MG: at 10:12

## 2023-12-29 RX ADMIN — SODIUM CHLORIDE 1710 ML: 9 INJECTION, SOLUTION INTRAVENOUS at 10:12

## 2023-12-29 RX ADMIN — OSELTAMIVIR PHOSPHATE 75 MG: 75 CAPSULE ORAL at 10:12

## 2023-12-29 RX ADMIN — ACETAMINOPHEN 1000 MG: 500 TABLET ORAL at 10:12

## 2023-12-29 RX ADMIN — OSELTAMIVIR PHOSPHATE 75 MG: 75 CAPSULE ORAL at 12:12

## 2023-12-29 RX ADMIN — BENZONATATE 100 MG: 100 CAPSULE ORAL at 10:12

## 2023-12-29 RX ADMIN — HEPARIN SODIUM 5000 UNITS: 5000 INJECTION INTRAVENOUS; SUBCUTANEOUS at 10:12

## 2023-12-29 RX ADMIN — CEFTRIAXONE SODIUM 2 G: 2 INJECTION, POWDER, FOR SOLUTION INTRAMUSCULAR; INTRAVENOUS at 12:12

## 2023-12-29 RX ADMIN — AZITHROMYCIN MONOHYDRATE 500 MG: 500 INJECTION, POWDER, LYOPHILIZED, FOR SOLUTION INTRAVENOUS at 12:12

## 2023-12-29 NOTE — ED NOTES
PHN authorization form faxed to [facility] to request authorization for EMS transport for higher level of care. Awaiting auth # from [facility].

## 2023-12-29 NOTE — ED NOTES
Attempted to call report. DEMARCO Benavidez states she will call back. RN was unable to take report at present time

## 2023-12-29 NOTE — PLAN OF CARE
VN cued into pt's room for introduction with pt's permission.  VN role explained and informed pt that VN would be working with bedside nurse and the rest of the care team.  Fall risk and bed alarm protocol education provided.  Instructed pt to call for assistance and agreeable.  Allowed time for questions. NAD noted.  Will cont to be available as needed.      12/29/23 5754   Patient Request   Patient Requested cough med, food and water   Nurse Notification   Bedside Nurse Notified? Yes   Name of Bedside Nurse shravan   Nurse Notfication Method Secure Chat   Nurse Notified Of Patient Request   Admission   Initial VN Admission Questions Complete   Communication Issues? None   Shift   Virtual Nurse - Patient Verbalized Approval Of Camera Use;VN Rounding   Safety/Activity   Patient Rounds call light in patient/parent reach;visualized patient;clutter free environment maintained   Safety Promotion/Fall Prevention Fall Risk reviewed with patient/family;instructed to call staff for mobility   Pain/Comfort/Sleep   Preferred Pain Scale number (Numeric Rating Pain Scale)   Pain Rating (0-10): Rest 0

## 2023-12-29 NOTE — ED PROVIDER NOTES
[facility]  Emergency Department Provider Note    Bri Park   73 y.o. female   [id number]      2023       History     This history was obtained from the patient without limitations.      She is a 73-year-old with the below past medical history who presents by personal transportation.  She developed flu-like symptoms around .  She was evaluated here in the ED on .  She underwent extensive workup with lab and imaging studies that was unremarkable.  She tested negative for influenza, COVID-19, and strep pharyngitis.  Chest x-ray was negative for acute processes.  She had no WBC or troponin elevation.  CTA chest was negative for infiltrates and pulmonary embolism.  She was discharged on albuterol MDI and a course of prednisone.  She returns today with worsening of her symptoms.  Since her initial visit, several family members have tested positive for influenza.  She feels very weak.  She continues to have cough, body aches, subjective fever, and shortness breath.        Past Medical History:   Diagnosis Date    Arthritis     Cataract     Dry eye     Hypertension       Past Surgical History:   Procedure Laterality Date    ANTERIOR CRUCIATE LIGAMENT REPAIR      APPENDECTOMY      CATARACT EXTRACTION Right      SECTION      HYSTERECTOMY  age 36    d/t uterine fibroids.    KNEE ARTHROSCOPY Right 06/15/2017    per pt    OOPHORECTOMY Right       Family History   Problem Relation Age of Onset    Arthritis Mother     Diabetes Mother     Heart disease Mother     Hyperlipidemia Mother     Cancer Father         Colon    Diabetes Sister     Heart disease Sister     Hyperlipidemia Sister       Social History     Socioeconomic History    Marital status:     Years of education: 15   Tobacco Use    Smoking status: Never    Smokeless tobacco: Never   Substance and Sexual Activity    Alcohol use: No     Alcohol/week: 0.0 standard drinks of alcohol    Drug use: No    Sexual activity: Never      Review  of patient's allergies indicates:   Allergen Reactions    Codeine sulfate Hives    Darvocet a500 [propoxyphene n-acetaminophen] Other (See Comments)    Darvon [propoxyphene] Other (See Comments)     hallucinations    Sulfa (sulfonamide antibiotics) Hives    Sulfamethoxazole-trimethoprim            Physical Examination     Initial Vitals [12/29/23 1006]   BP Pulse Resp Temp SpO2   (!) 145/75 (!) 124 (!) 24 98.8 °F (37.1 °C) (!) 92 %      MAP       --           Physical Exam    Nursing note and vitals reviewed.  Constitutional: She is not diaphoretic. No distress.   HENT:   Head: Normocephalic and atraumatic.   Mouth/Throat: Oropharynx is clear and moist.   Eyes: No scleral icterus.   Cardiovascular:  Regular rhythm.   Tachycardia present.         No murmur heard.  Pulmonary/Chest: No accessory muscle usage or stridor. Tachypnea noted. She has no decreased breath sounds. She has rales in the right lower field and the left lower field.   Abdominal: Abdomen is soft. She exhibits no distension. There is no abdominal tenderness.   Musculoskeletal:         General: No edema.     Neurological: She is alert and oriented to person, place, and time. GCS score is 15. GCS eye subscore is 4. GCS verbal subscore is 5. GCS motor subscore is 6.   Skin: Skin is warm and dry. No pallor.            Labs     Labs Reviewed   INFLUENZA A & B BY MOLECULAR - Abnormal; Notable for the following components:       Result Value    Influenza B, Molecular Positive (*)     All other components within normal limits   CBC W/ AUTO DIFFERENTIAL - Abnormal; Notable for the following components:    WBC 23.64 (*)     Platelets 580 (*)     Immature Granulocytes 1.7 (*)     Gran # (ANC) 18.0 (*)     Immature Grans (Abs) 0.41 (*)     Mono # 2.7 (*)     Gran % 76.3 (*)     Lymph % 9.7 (*)     Platelet Estimate Increased (*)     All other components within normal limits   COMPREHENSIVE METABOLIC PANEL - Abnormal; Notable for the following components:     Sodium 135 (*)     Glucose 151 (*)     All other components within normal limits   URINALYSIS, REFLEX TO URINE CULTURE - Abnormal; Notable for the following components:    Specific Gravity, UA <=1.005 (*)     Occult Blood UA 3+ (*)     All other components within normal limits    Narrative:     Preferred Collection Type->Urine, Clean Catch  Specimen Source->Urine   URINALYSIS MICROSCOPIC - Abnormal; Notable for the following components:    RBC, UA 12 (*)     All other components within normal limits    Narrative:     Preferred Collection Type->Urine, Clean Catch  Specimen Source->Urine   LACTIC ACID, PLASMA   MAGNESIUM   PHOSPHORUS   TROPONIN I   NT-PRO NATRIURETIC PEPTIDE   SARS-COV-2 RNA AMPLIFICATION, QUAL    Narrative:     Is the patient symptomatic?->Yes   PROCALCITONIN        Imaging     Imaging Results              X-Ray Chest AP Portable (Final result)  Result time 12/29/23 11:03:37      Final result by JESSICA Baez Sr., MD (12/29/23 11:03:37)                   Impression:      There has been interval development of a mild amount of haziness in the base of the right lung.  This is consistent with the patient's history and characteristic of pneumonia.      Electronically signed by: Dajuan Baez MD  Date:    12/29/2023  Time:    11:03               Narrative:    EXAMINATION:  XR CHEST AP PORTABLE    CLINICAL HISTORY:  Hypoxia;    COMPARISON:  12/19/2020    FINDINGS:  The size of the heart is normal.  There has been interval development of a mild amount of haziness in the base of the right lung.  There is no focal pulmonary infiltrate visualized in the left lung.  There is no pneumothorax.  The costophrenic angles are sharp.                                        ED Course     The patient received the following medications:  Medications   aspirin EC tablet 81 mg (has no administration in time range)   latanoprost 0.005 % ophthalmic solution 1 drop (has no administration in time range)   levothyroxine  tablet 75 mcg (has no administration in time range)   lisinopriL-hydrochlorothiazide 20-12.5 mg per tablet 1 tablet (has no administration in time range)   metoprolol tartrate (LOPRESSOR) tablet 50 mg (has no administration in time range)   sodium chloride 0.9% flush 10 mL (has no administration in time range)   naloxone 0.4 mg/mL injection 0.02 mg (has no administration in time range)   glucose chewable tablet 16 g (has no administration in time range)   glucose chewable tablet 24 g (has no administration in time range)   glucagon (human recombinant) injection 1 mg (has no administration in time range)   heparin (porcine) injection 5,000 Units (has no administration in time range)   acetaminophen tablet 650 mg (has no administration in time range)   polyethylene glycol packet 17 g (has no administration in time range)   ondansetron injection 4 mg (has no administration in time range)   melatonin tablet 6 mg (has no administration in time range)   simethicone chewable tablet 80 mg (has no administration in time range)   dextrose 10% bolus 125 mL 125 mL (has no administration in time range)   dextrose 10% bolus 250 mL 250 mL (has no administration in time range)   oseltamivir capsule 75 mg (has no administration in time range)   sodium chloride 0.9% bolus 1,710 mL 1,710 mL (0 mLs Intravenous Stopped 12/29/23 1145)   acetaminophen tablet 1,000 mg (1,000 mg Oral Given 12/29/23 1058)   albuterol sulfate nebulizer solution 2.5 mg (2.5 mg Nebulization Given 12/29/23 1105)   cefTRIAXone (ROCEPHIN) 2 g in dextrose 5 % in water (D5W) 100 mL IVPB (MB+) (0 g Intravenous Stopped 12/29/23 1250)   azithromycin (ZITHROMAX) 500 mg in dextrose 5 % (D5W) 250 mL IVPB (Vial-Mate) (0 mg Intravenous Stopped 12/29/23 1343)   oseltamivir capsule 75 mg (75 mg Oral Given 12/29/23 1234)       Procedures    ED Course as of 12/29/23 1737   Fri Dec 29, 2023   1206 Influenza B, Molecular(!): Positive [LP]   1206 WBC(!): 23.64 [LP]   1220 I called  and discussed the patient's presentation, exam, workup, and ED management with Dr. Prasad who agrees to admit the patient. [LP]   1254 EKG 12-lead  Independent interpretation:   Sinus tachycardia.  Ventricular rate 106 beats per minute.  Normal axis.  Right bundle-branch block.  Prolonged QTc interval (507 milliseconds).  No ST segment elevation. [LP]      ED Course User Index  [LP] Guillaume Cruz III, MD        Medical Decision Making                 Medical Decision Making  Differential diagnoses:  COVID-19 virus infection, influenza, pneumonia, arrhythmia, new onset heart failure with decompensation    Amount and/or Complexity of Data Reviewed  Labs: ordered. Decision-making details documented in ED Course.  Radiology: ordered.  ECG/medicine tests:  Decision-making details documented in ED Course.    Risk  OTC drugs.  Prescription drug management.  Decision regarding hospitalization.         Diagnoses       ICD-10-CM ICD-9-CM   1. Pneumonia of right lower lobe due to infectious organism  J18.9 486   2. Hypoxia  R09.02 799.02   3. Influenza B  J10.1 487.1   4. Chest pain  R07.9 786.50         Dispostion      ED Disposition Condition    Admit Stable            Follow-up Information    None           Guillaume Cruz III, MD  12/29/23 9290

## 2023-12-29 NOTE — Clinical Note
Diagnosis: Pneumonia [246293]   Future Attending Provider: CASSIDY PARDO [[id number]]   Admitting Provider:: CASSIDY PARDO [[id number]]   Reason for IP Medical Treatment  (Clinical interventions that can only be accomplished in the IP setting? ) :: Management of pneumonia   I certify that Inpatient services for greater than or equal to 2 midnights are medically necessary:: Yes   Plans for Post-Acute care--if anticipated (pick the single best option):: A. No post acute care anticipated at this time

## 2023-12-29 NOTE — Clinical Note
Surinder Khalil accompanied their mother to the emergency department on 12/29/2023. They may return to work on 12/30/2023.      If you have any questions or concerns, please don't hesitate to call.      Fatoumata Garrido, RN RN

## 2023-12-29 NOTE — ED TRIAGE NOTES
Pt to ED c/o increased SOB. Seen here in ED on 12/19/23. Dx with URI. Grandson and Daughter dx with flu recently. Pt reports increased SOB and WOB x 3 days. Sats 92% upon arrival to ED. Desats to 88% with movement. Placed on 2lpm O2 nasal cannula. Sats improved to 96%. Febrile here in ED. Pt reports dizziness and near syncopal episode 2 days ago.  Pt AAOx4. VSS. Placed on cardiac and o2 monitor. EKG completed and shown.

## 2023-12-29 NOTE — Clinical Note
Surinder Khalil accompanied their child to the emergency department on 12/29/2023. They may return to work on 12/30/2023.      If you have any questions or concerns, please don't hesitate to call.      Fatoumata Garrido, RN RN

## 2023-12-30 LAB
ANION GAP SERPL CALC-SCNC: 9 MMOL/L (ref 8–16)
BASOPHILS # BLD AUTO: 0.07 K/UL (ref 0–0.2)
BASOPHILS NFR BLD: 0.5 % (ref 0–1.9)
BUN SERPL-MCNC: 6 MG/DL (ref 8–23)
CALCIUM SERPL-MCNC: 8.7 MG/DL (ref 8.7–10.5)
CHLORIDE SERPL-SCNC: 104 MMOL/L (ref 95–110)
CO2 SERPL-SCNC: 24 MMOL/L (ref 23–29)
CREAT SERPL-MCNC: 0.8 MG/DL (ref 0.5–1.4)
DIFFERENTIAL METHOD BLD: ABNORMAL
EOSINOPHIL # BLD AUTO: 0.2 K/UL (ref 0–0.5)
EOSINOPHIL NFR BLD: 1.2 % (ref 0–8)
ERYTHROCYTE [DISTWIDTH] IN BLOOD BY AUTOMATED COUNT: 13.4 % (ref 11.5–14.5)
EST. GFR  (NO RACE VARIABLE): >60 ML/MIN/1.73 M^2
GLUCOSE SERPL-MCNC: 96 MG/DL (ref 70–110)
HCT VFR BLD AUTO: 32.5 % (ref 37–48.5)
HGB BLD-MCNC: 11.2 G/DL (ref 12–16)
IMM GRANULOCYTES # BLD AUTO: 0.16 K/UL (ref 0–0.04)
IMM GRANULOCYTES NFR BLD AUTO: 1.1 % (ref 0–0.5)
LYMPHOCYTES # BLD AUTO: 2.7 K/UL (ref 1–4.8)
LYMPHOCYTES NFR BLD: 18.5 % (ref 18–48)
MCH RBC QN AUTO: 28.3 PG (ref 27–31)
MCHC RBC AUTO-ENTMCNC: 34.5 G/DL (ref 32–36)
MCV RBC AUTO: 82 FL (ref 82–98)
MONOCYTES # BLD AUTO: 1.6 K/UL (ref 0.3–1)
MONOCYTES NFR BLD: 10.7 % (ref 4–15)
NEUTROPHILS # BLD AUTO: 9.9 K/UL (ref 1.8–7.7)
NEUTROPHILS NFR BLD: 68 % (ref 38–73)
NRBC BLD-RTO: 0 /100 WBC
PLATELET # BLD AUTO: 463 K/UL (ref 150–450)
PMV BLD AUTO: 9.2 FL (ref 9.2–12.9)
POTASSIUM SERPL-SCNC: 3.2 MMOL/L (ref 3.5–5.1)
RBC # BLD AUTO: 3.96 M/UL (ref 4–5.4)
SODIUM SERPL-SCNC: 137 MMOL/L (ref 136–145)
WBC # BLD AUTO: 14.55 K/UL (ref 3.9–12.7)

## 2023-12-30 PROCEDURE — 99900035 HC TECH TIME PER 15 MIN (STAT)

## 2023-12-30 PROCEDURE — 11000001 HC ACUTE MED/SURG PRIVATE ROOM

## 2023-12-30 PROCEDURE — 36415 COLL VENOUS BLD VENIPUNCTURE: CPT | Performed by: STUDENT IN AN ORGANIZED HEALTH CARE EDUCATION/TRAINING PROGRAM

## 2023-12-30 PROCEDURE — 25000003 PHARM REV CODE 250: Performed by: STUDENT IN AN ORGANIZED HEALTH CARE EDUCATION/TRAINING PROGRAM

## 2023-12-30 PROCEDURE — 27000221 HC OXYGEN, UP TO 24 HOURS

## 2023-12-30 PROCEDURE — 85025 COMPLETE CBC W/AUTO DIFF WBC: CPT | Performed by: STUDENT IN AN ORGANIZED HEALTH CARE EDUCATION/TRAINING PROGRAM

## 2023-12-30 PROCEDURE — 94761 N-INVAS EAR/PLS OXIMETRY MLT: CPT

## 2023-12-30 PROCEDURE — 80048 BASIC METABOLIC PNL TOTAL CA: CPT | Performed by: STUDENT IN AN ORGANIZED HEALTH CARE EDUCATION/TRAINING PROGRAM

## 2023-12-30 PROCEDURE — 63600175 PHARM REV CODE 636 W HCPCS: Performed by: STUDENT IN AN ORGANIZED HEALTH CARE EDUCATION/TRAINING PROGRAM

## 2023-12-30 PROCEDURE — 25000003 PHARM REV CODE 250: Performed by: FAMILY MEDICINE

## 2023-12-30 PROCEDURE — 27000207 HC ISOLATION

## 2023-12-30 RX ORDER — SODIUM CHLORIDE 9 MG/ML
INJECTION, SOLUTION INTRAVENOUS
Status: DISCONTINUED | OUTPATIENT
Start: 2023-12-30 | End: 2024-01-01 | Stop reason: HOSPADM

## 2023-12-30 RX ADMIN — OSELTAMIVIR PHOSPHATE 75 MG: 75 CAPSULE ORAL at 10:12

## 2023-12-30 RX ADMIN — SODIUM CHLORIDE: 9 INJECTION, SOLUTION INTRAVENOUS at 02:12

## 2023-12-30 RX ADMIN — ASPIRIN 81 MG: 81 TABLET, COATED ORAL at 10:12

## 2023-12-30 RX ADMIN — HEPARIN SODIUM 5000 UNITS: 5000 INJECTION INTRAVENOUS; SUBCUTANEOUS at 10:12

## 2023-12-30 RX ADMIN — CEFTRIAXONE SODIUM 1 G: 1 INJECTION, POWDER, FOR SOLUTION INTRAMUSCULAR; INTRAVENOUS at 02:12

## 2023-12-30 RX ADMIN — LEVOTHYROXINE SODIUM 75 MCG: 75 TABLET ORAL at 05:12

## 2023-12-30 RX ADMIN — HEPARIN SODIUM 5000 UNITS: 5000 INJECTION INTRAVENOUS; SUBCUTANEOUS at 05:12

## 2023-12-30 RX ADMIN — LATANOPROST 1 DROP: 50 SOLUTION/ DROPS OPHTHALMIC at 10:12

## 2023-12-30 RX ADMIN — POTASSIUM BICARBONATE 25 MEQ: 977.5 TABLET, EFFERVESCENT ORAL at 10:12

## 2023-12-30 RX ADMIN — HYDROCHLOROTHIAZIDE 12.5 MG: 12.5 TABLET ORAL at 10:12

## 2023-12-30 RX ADMIN — POLYETHYLENE GLYCOL 3350 17 G: 17 POWDER, FOR SOLUTION ORAL at 10:12

## 2023-12-30 RX ADMIN — LISINOPRIL 20 MG: 20 TABLET ORAL at 10:12

## 2023-12-30 RX ADMIN — METOPROLOL TARTRATE 50 MG: 50 TABLET, FILM COATED ORAL at 10:12

## 2023-12-30 RX ADMIN — BENZONATATE 100 MG: 100 CAPSULE ORAL at 10:12

## 2023-12-30 RX ADMIN — AZITHROMYCIN MONOHYDRATE 500 MG: 500 INJECTION, POWDER, LYOPHILIZED, FOR SOLUTION INTRAVENOUS at 01:12

## 2023-12-30 RX ADMIN — HEPARIN SODIUM 5000 UNITS: 5000 INJECTION INTRAVENOUS; SUBCUTANEOUS at 01:12

## 2023-12-30 RX ADMIN — BENZONATATE 100 MG: 100 CAPSULE ORAL at 06:12

## 2023-12-30 RX ADMIN — Medication 6 MG: at 10:12

## 2023-12-30 NOTE — H&P
[facility] Medicine  History & Physical    Patient Name: Bri Park  MRN: [id number]  Patient Class: IP- Inpatient  Admission Date: 12/29/2023  Attending Physician: Caitlin Mckeon MD   Primary Care Provider: Margaret Hawthorne NP         Patient information was obtained from patient and ER records.     Subjective:     Principal Problem:Influenza B    Chief Complaint:   Chief Complaint   Patient presents with    Cough     Cough and body aches since last week, seen for same, no changes - O2 89-92        HPI: Bri Park is a 73-year-old female with a past medical history of hypertension who presents with flu-like symptoms.    Patient states that she has had productive cough over the past few days with associated body aches, fever, generalized weakness and shortness of breath.  She states that her symptoms initially started around 12/16 and she was evaluated in the ED on 12/19 with negative influenza, COVID, strep pharyngitis tests.  Other imaging was unremarkable.  She was discharged with steroids and albuterol.  She returns today with worsening of her symptoms.    Triage vitals were significant for tachycardia, hypertension, tachypnea, slight hypoxia.  Physical exam was significant for tachycardia, tachypnea, rales.    CBC was significant for leukocytosis, thrombocytosis.  CMP was significant for slight hyponatremia.  Pro BNP, troponin, lactate was unremarkable.  Patient became positive for influenza B.    UA was fairly unremarkable for leukocytes although she was noted to have occult blood.  Blood cultures were pending.    Chest x-ray showed right lower lobe infiltrate consistent with pneumonia.    Patient was given a dose of IV antibiotics.    Patient admitted for influenza and possible bacterial pneumonia.    Past Medical History:   Diagnosis Date    Arthritis     Cataract     Dry eye     Hypertension        Past Surgical History:   Procedure Laterality Date    ANTERIOR CRUCIATE LIGAMENT  REPAIR      APPENDECTOMY      CATARACT EXTRACTION Right      SECTION      HYSTERECTOMY  age 36    d/t uterine fibroids.    KNEE ARTHROSCOPY Right 06/15/2017    per pt    OOPHORECTOMY Right        Review of patient's allergies indicates:   Allergen Reactions    Codeine sulfate Hives    Darvocet a500 [propoxyphene n-acetaminophen] Other (See Comments)    Darvon [propoxyphene] Other (See Comments)     hallucinations    Sulfa (sulfonamide antibiotics) Hives    Sulfamethoxazole-trimethoprim        No current facility-administered medications on file prior to encounter.     Current Outpatient Medications on File Prior to Encounter   Medication Sig    albuterol (PROVENTIL/VENTOLIN HFA) 90 mcg/actuation inhaler Inhale 1-2 puffs into the lungs every 6 (six) hours as needed for Wheezing or Shortness of Breath. Rescue    aspirin (ECOTRIN) 81 MG EC tablet Take 81 mg by mouth once daily.    latanoprost (XALATAN) 0.005 % ophthalmic solution Place 1 drop into both eyes once daily.    levothyroxine (EUTHYROX) 75 MCG tablet Take 1 tablet (75 mcg total) by mouth before breakfast.    lisinopriL-hydrochlorothiazide (PRINZIDE,ZESTORETIC) 20-12.5 mg per tablet Take 1 tablet by mouth once daily.    metoprolol tartrate (LOPRESSOR) 50 MG tablet Take 1 tablet (50 mg total) by mouth 2 (two) times daily.    sod sulf-pot chloride-mag sulf (SUTAB) 1.479-0.188- 0.225 gram tablet Take 12 tablets by mouth once daily. Take according to package instructions with indicated amount of water.     Family History       Problem Relation (Age of Onset)    Arthritis Mother    Cancer Father    Diabetes Mother, Sister    Heart disease Mother, Sister    Hyperlipidemia Mother, Sister          Tobacco Use    Smoking status: Never    Smokeless tobacco: Never   Substance and Sexual Activity    Alcohol use: No     Alcohol/week: 0.0 standard drinks of alcohol    Drug use: No    Sexual activity: Never     Review of Systems   Constitutional:  Positive for  fatigue and fever. Negative for appetite change and chills.   HENT:  Negative for ear discharge and ear pain.    Respiratory:  Positive for cough and shortness of breath. Negative for choking.    Cardiovascular:  Negative for chest pain and leg swelling.   Gastrointestinal:  Negative for anal bleeding and blood in stool.   Endocrine: Negative for polydipsia and polyphagia.   Genitourinary:  Negative for flank pain and hematuria.   Musculoskeletal:  Negative for back pain and gait problem.   Skin:  Negative for pallor and rash.   Allergic/Immunologic: Negative for food allergies and immunocompromised state.   Neurological:  Negative for seizures and speech difficulty.   Hematological:  Negative for adenopathy. Does not bruise/bleed easily.   Psychiatric/Behavioral:  Negative for decreased concentration and dysphoric mood.      Objective:     Vital Signs (Most Recent):  Temp: 97.5 °F (36.4 °C) (12/29/23 2313)  Pulse: 91 (12/29/23 2313)  Resp: (!) 21 (12/29/23 2313)  BP: (!) 156/83 (12/29/23 2313)  SpO2: 98 % (12/29/23 2313) Vital Signs (24h Range):  Temp:  [96.2 °F (35.7 °C)-100.9 °F (38.3 °C)] 97.5 °F (36.4 °C)  Pulse:  [] 91  Resp:  [14-33] 21  SpO2:  [89 %-98 %] 98 %  BP: (140-201)/(71-91) 156/83     Weight: 77.1 kg (170 lb)  Body mass index is 28.29 kg/m².     Physical Exam  Vitals reviewed.   Constitutional:       General: She is not in acute distress.     Appearance: She is not toxic-appearing.   HENT:      Right Ear: There is no impacted cerumen.      Left Ear: There is no impacted cerumen.      Nose: No congestion or rhinorrhea.      Mouth/Throat:      Pharynx: No oropharyngeal exudate or posterior oropharyngeal erythema.   Eyes:      General:         Right eye: No discharge.         Left eye: No discharge.   Cardiovascular:      Rate and Rhythm: Normal rate.      Heart sounds: No murmur heard.     No gallop.   Pulmonary:      Effort: Respiratory distress present.      Breath sounds: Rales present.  "  Abdominal:      General: There is no distension.      Tenderness: There is no abdominal tenderness.   Musculoskeletal:         General: No swelling or deformity.      Cervical back: No rigidity.   Lymphadenopathy:      Cervical: No cervical adenopathy.   Skin:     Coloration: Skin is not jaundiced.      Findings: No bruising.   Neurological:      General: No focal deficit present.      Cranial Nerves: No cranial nerve deficit.      Motor: No weakness.   Psychiatric:         Mood and Affect: Mood normal.                Significant Labs: All pertinent labs within the past 24 hours have been reviewed.  Bilirubin:   Recent Labs   Lab 12/29/23  1038   BILITOT 0.8     Blood Culture: No results for input(s): "LABBLOO" in the last 48 hours.  BMP:   Recent Labs   Lab 12/29/23  1038   *   *   K 3.5   CL 96   CO2 26   BUN 10   CREATININE 0.93   CALCIUM 9.3   MG 2.0     CBC:   Recent Labs   Lab 12/29/23  1038   WBC 23.64*   HGB 13.8   HCT 40.0   *     CMP:   Recent Labs   Lab 12/29/23  1038   *   K 3.5   CL 96   CO2 26   *   BUN 10   CREATININE 0.93   CALCIUM 9.3   PROT 8.4   ALBUMIN 4.1   BILITOT 0.8   ALKPHOS 80   AST 28   ALT 22   ANIONGAP 13     Lactic Acid:   Recent Labs   Lab 12/29/23  1038   LACTATE 1.4     Lipid Panel: No results for input(s): "CHOL", "HDL", "LDLCALC", "TRIG", "CHOLHDL" in the last 48 hours.  Magnesium:   Recent Labs   Lab 12/29/23  1038   MG 2.0     Troponin:   Recent Labs   Lab 12/29/23  1038   TROPONINI <0.012     TSH: No results for input(s): "TSH" in the last 4320 hours.  Urine Culture: No results for input(s): "LABURIN" in the last 48 hours.    Significant Imaging: I have reviewed all pertinent imaging results/findings within the past 24 hours.  I have reviewed and interpreted all pertinent imaging results/findings within the past 24 hours.  Assessment/Plan:     * Influenza B  Patient positive for influenza B    Plan:  Continue with Tamiflu for 5 day total " course      Hyponatremia  Patient has hyponatremia which is controlled,We will aim to correct the sodium by 4-6mEq in 24 hours. We will monitor sodium Daily. The hyponatremia is due to Dehydration/hypovolemia. We will obtain the following studies:  We will hold off. We will treat the hyponatremia with IV fluids as follows:  1 L. The patient's sodium results have been reviewed and are listed below.  Recent Labs   Lab 12/29/23  1038   *       Thrombocytosis  Likely acute inflammatory marker      Pneumonia  Chest x-ray shows right lobe hazy opacity consistent with pneumonia  Leukocytosis likely is due to steroids although may have component of infection    Plan:  Continue antibiotics  Continue oxygen as needed  Continue p.r.n. antitussives        HTN (hypertension)  Chronic, controlled. Latest blood pressure and vitals reviewed-     Temp:  [96.2 °F (35.7 °C)-100.9 °F (38.3 °C)]   Pulse:  []   Resp:  [14-33]   BP: (140-201)/(71-91)   SpO2:  [89 %-98 %] .   Home meds for hypertension were reviewed and noted below.   Hypertension Medications               lisinopriL-hydrochlorothiazide (PRINZIDE,ZESTORETIC) 20-12.5 mg per tablet Take 1 tablet by mouth once daily.    metoprolol tartrate (LOPRESSOR) 50 MG tablet Take 1 tablet (50 mg total) by mouth 2 (two) times daily.            While in the hospital, will manage blood pressure as follows; Continue home antihypertensive regimen    Will utilize p.r.n. blood pressure medication only if patient's blood pressure greater than 180/110 and she develops symptoms such as worsening chest pain or shortness of breath.      VTE Risk Mitigation (From admission, onward)           Ordered     heparin (porcine) injection 5,000 Units  Every 8 hours         12/29/23 1734     IP VTE HIGH RISK PATIENT  Once         12/29/23 1734     Place sequential compression device  Until discontinued         12/29/23 1734                                    Neena Swain MD  Department of  [facility]

## 2023-12-30 NOTE — PROGRESS NOTES
[facility] Medicine  Progress Note    Patient Name: Bri Park  MRN: [id number]  Patient Class: IP- Inpatient   Admission Date: 12/29/2023  Length of Stay: 1 days  Attending Physician: Brenda Hutchison*  Primary Care Provider: Margaret Hawthorne NP        Subjective:     Principal Problem:Influenza B        HPI:  Bri Park is a 73-year-old female with a past medical history of hypertension who presents with flu-like symptoms.    Patient states that she has had productive cough over the past few days with associated body aches, fever, generalized weakness and shortness of breath.  She states that her symptoms initially started around 12/16 and she was evaluated in the ED on 12/19 with negative influenza, COVID, strep pharyngitis tests.  Other imaging was unremarkable.  She was discharged with steroids and albuterol.  She returns today with worsening of her symptoms.    Triage vitals were significant for tachycardia, hypertension, tachypnea, slight hypoxia.  Physical exam was significant for tachycardia, tachypnea, rales.    CBC was significant for leukocytosis, thrombocytosis.  CMP was significant for slight hyponatremia.  Pro BNP, troponin, lactate was unremarkable.  Patient became positive for influenza B.    UA was fairly unremarkable for leukocytes although she was noted to have occult blood.  Blood cultures were pending.    Chest x-ray showed right lower lobe infiltrate consistent with pneumonia.    Patient was given a dose of IV antibiotics.    Patient admitted for influenza and possible bacterial pneumonia.    Overview/Hospital Course:  No notes on file    Interval History: awake and alert, still having distress coughing spell  Replace potassium,   Wbc down trending- and azithromycin.  Continue cough medication   Continue supplemental oxygen-wean as tolerated    Review of Systems   Constitutional:  Positive for fatigue and fever. Negative for appetite change and chills.    Respiratory:  Positive for cough and shortness of breath. Negative for choking.    Cardiovascular:  Negative for chest pain and leg swelling.   Gastrointestinal:  Negative for anal bleeding and blood in stool.   Genitourinary:  Negative for flank pain and hematuria.   Musculoskeletal:  Negative for back pain and gait problem.   Skin:  Negative for pallor and rash.   Allergic/Immunologic: Negative for food allergies and immunocompromised state.   Neurological:  Negative for seizures and speech difficulty.   Hematological:  Negative for adenopathy. Does not bruise/bleed easily.   Psychiatric/Behavioral:  Negative for decreased concentration and dysphoric mood.      Objective:     Vital Signs (Most Recent):  Temp: 98.1 °F (36.7 °C) (12/30/23 0732)  Pulse: 86 (12/30/23 0732)  Resp: 18 (12/30/23 0732)  BP: 134/81 (12/30/23 0732)  SpO2: 98 % (12/30/23 0732) Vital Signs (24h Range):  Temp:  [96.2 °F (35.7 °C)-98.9 °F (37.2 °C)] 98.1 °F (36.7 °C)  Pulse:  [] 86  Resp:  [14-33] 18  SpO2:  [89 %-98 %] 98 %  BP: (131-201)/(71-91) 134/81     Weight: 77.1 kg (170 lb)  Body mass index is 28.29 kg/m².    Intake/Output Summary (Last 24 hours) at 12/30/2023 1029  Last data filed at 12/30/2023 0705  Gross per 24 hour   Intake 2312.89 ml   Output 800 ml   Net 1512.89 ml         Physical Exam  Vitals reviewed.   Constitutional:       General: She is not in acute distress.     Appearance: She is not toxic-appearing.   HENT:      Mouth/Throat:      Pharynx: No oropharyngeal exudate or posterior oropharyngeal erythema.   Eyes:      General:         Right eye: No discharge.         Left eye: No discharge.   Cardiovascular:      Rate and Rhythm: Normal rate.      Heart sounds: No murmur heard.     No gallop.   Pulmonary:      Effort: No respiratory distress.      Breath sounds: Rales present.   Abdominal:      General: There is no distension.      Tenderness: There is no abdominal tenderness.   Musculoskeletal:         General: No  "swelling or deformity.      Cervical back: Normal range of motion. No rigidity.   Lymphadenopathy:      Cervical: No cervical adenopathy.   Skin:     Coloration: Skin is not jaundiced.      Findings: No bruising.   Neurological:      General: No focal deficit present.      Cranial Nerves: No cranial nerve deficit.      Motor: No weakness.   Psychiatric:         Mood and Affect: Mood normal.             Significant Labs: A1C: No results for input(s): "HGBA1C" in the last 4320 hours.  ABGs: No results for input(s): "PH", "PCO2", "HCO3", "POCSATURATED", "BE", "TOTALHB", "COHB", "METHB", "O2HB", "POCFIO2", "PO2" in the last 48 hours.  Blood Culture:   Recent Labs   Lab 12/29/23  1034 12/29/23  1038   LABBLOO No Growth to date No Growth to date     CBC:   Recent Labs   Lab 12/29/23  1038 12/30/23  0420   WBC 23.64* 14.55*   HGB 13.8 11.2*   HCT 40.0 32.5*   * 463*     CMP:   Recent Labs   Lab 12/29/23  1038 12/30/23  0420   * 137   K 3.5 3.2*   CL 96 104   CO2 26 24   * 96   BUN 10 6*   CREATININE 0.93 0.8   CALCIUM 9.3 8.7   PROT 8.4  --    ALBUMIN 4.1  --    BILITOT 0.8  --    ALKPHOS 80  --    AST 28  --    ALT 22  --    ANIONGAP 13 9     Cardiac Markers: No results for input(s): "CKMB", "MYOGLOBIN", "BNP", "TROPISTAT" in the last 48 hours.  Lactic Acid:   Recent Labs   Lab 12/29/23  1038   LACTATE 1.4     Lipase: No results for input(s): "LIPASE" in the last 48 hours.  Lipid Panel: No results for input(s): "CHOL", "HDL", "LDLCALC", "TRIG", "CHOLHDL" in the last 48 hours.  Magnesium:   Recent Labs   Lab 12/29/23  1038   MG 2.0     Troponin:   Recent Labs   Lab 12/29/23  1038   TROPONINI <0.012     TSH: No results for input(s): "TSH" in the last 4320 hours.  Urine Culture: No results for input(s): "LABURIN" in the last 48 hours.  Urine Studies:   Recent Labs   Lab 12/29/23  1210   COLORU Yellow   APPEARANCEUA Clear   PHUR 7.0   SPECGRAV <=1.005*   PROTEINUA Negative   GLUCUA Negative   KETONESU " Negative   BILIRUBINUA Negative   OCCULTUA 3+*   NITRITE Negative   UROBILINOGEN Negative   LEUKOCYTESUR Negative   RBCUA 12*       Significant Imaging: I have reviewed all pertinent imaging results/findings within the past 24 hours.    Assessment/Plan:      * Influenza B  Patient positive for influenza B    Plan:  Continue with Tamiflu for 5 day total course      Hyponatremia  Patient has hyponatremia which is controlled,We will aim to correct the sodium by 4-6mEq in 24 hours. We will monitor sodium Daily. The hyponatremia is due to Dehydration/hypovolemia. We will obtain the following studies:  We will hold off. We will treat the hyponatremia with IV fluids as follows:  1 L. The patient's sodium results have been reviewed and are listed below.  Recent Labs   Lab 12/29/23  1038   *       Thrombocytosis  Likely acute inflammatory marker      Pneumonia  Chest x-ray shows right lobe hazy opacity consistent with pneumonia  Leukocytosis likely is due to steroids although may have component of infection    Plan:  Continue antibiotics  Continue oxygen as needed  Continue p.r.n. antitussives        HTN (hypertension)  Chronic, controlled. Latest blood pressure and vitals reviewed-     Temp:  [96.2 °F (35.7 °C)-100.9 °F (38.3 °C)]   Pulse:  []   Resp:  [14-33]   BP: (140-201)/(71-91)   SpO2:  [89 %-98 %] .   Home meds for hypertension were reviewed and noted below.   Hypertension Medications               lisinopriL-hydrochlorothiazide (PRINZIDE,ZESTORETIC) 20-12.5 mg per tablet Take 1 tablet by mouth once daily.    metoprolol tartrate (LOPRESSOR) 50 MG tablet Take 1 tablet (50 mg total) by mouth 2 (two) times daily.            While in the hospital, will manage blood pressure as follows; Continue home antihypertensive regimen    Will utilize p.r.n. blood pressure medication only if patient's blood pressure greater than 180/110 and she develops symptoms such as worsening chest pain or shortness of  breath.      VTE Risk Mitigation (From admission, onward)           Ordered     heparin (porcine) injection 5,000 Units  Every 8 hours         12/29/23 1734     IP VTE HIGH RISK PATIENT  Once         12/29/23 1734     Place sequential compression device  Until discontinued         12/29/23 1734                    Discharge Planning   JCARLOS:      Code Status: Full Code   Is the patient medically ready for discharge?:     Reason for patient still in hospital (select all that apply): Patient trending condition  Discharge Plan A: Home with family                  Brenda Hutchison MD  Department of [facility] Medicine   [facility]

## 2023-12-30 NOTE — NURSING
Patient transferred to unit from [facility] . Patient accompanied by paramedics per stretcher. On O2 at 2L/NC. Patient alert and awake. Vitals taken, Oriented to  set up. Placed patient in droplet isolation. Call light within reach.

## 2023-12-30 NOTE — ASSESSMENT & PLAN NOTE
Chronic, controlled. Latest blood pressure and vitals reviewed-     Temp:  [96.2 °F (35.7 °C)-100.9 °F (38.3 °C)]   Pulse:  []   Resp:  [14-33]   BP: (140-201)/(71-91)   SpO2:  [89 %-98 %] .   Home meds for hypertension were reviewed and noted below.   Hypertension Medications               lisinopriL-hydrochlorothiazide (PRINZIDE,ZESTORETIC) 20-12.5 mg per tablet Take 1 tablet by mouth once daily.    metoprolol tartrate (LOPRESSOR) 50 MG tablet Take 1 tablet (50 mg total) by mouth 2 (two) times daily.            While in the hospital, will manage blood pressure as follows; Continue home antihypertensive regimen    Will utilize p.r.n. blood pressure medication only if patient's blood pressure greater than 180/110 and she develops symptoms such as worsening chest pain or shortness of breath.

## 2023-12-30 NOTE — PLAN OF CARE
[facility] - Telemetry  Initial Discharge Assessment       Primary Care Provider: Margaret Hawthorne NP    Admission Diagnosis: Pneumonia [J18.9]  Influenza B [J10.1]  Hypoxia [R09.02]  Pneumonia of right lower lobe due to infectious organism [J18.9]    Admission Date: 12/29/2023  Expected Discharge Date: 1/1/2023    Payor: [employer] MEDICARE / Plan: [employer] [facility] HEALTH / Product Type: Medicare Advantage /     Extended Emergency Contact Information  Primary Emergency Contact: Bob Parkharinder  Address: [address] [place] [address] [place]  Mobile Phone: [phone]  Relation: Daughter  Secondary Emergency Contact: Nidhi [place] [address] [facility]  Home Phone: [phone]  Mobile Phone: [phone]  Relation: Daughter    Discharge Plan A: (P) Home with family  Discharge Plan B: (P) Home Health      [facility] [address] [place] [address], [place] - [address] [facility]  [address] [facility] [address]  Phone: [phone] Fax: [phone]      Initial Assessment (most recent)       Adult Discharge Assessment - 12/30/23 0755          Discharge Assessment    Assessment Type Discharge Planning Assessment (P)      Confirmed/corrected address, phone number and insurance Yes (P)      Confirmed Demographics Correct on Facesheet (P)      Source of Information patient (P)      Communicated JCARLOS with patient/caregiver Date not available/Unable to determine (P)      People in Home child(terrance), adult (P)    Delicia hutchinson ([phone])    Do you expect to return to your current living situation? Yes (P)      Do you have help at home or someone to help you manage your care at home? Yes (P)      Prior to hospitilization cognitive status: Alert/Oriented (P)      Current cognitive status: Alert/Oriented (P)      Equipment Currently Used at Home none (P)      Readmission within 30 days? No (P)      Patient currently being followed  by outpatient case management? No (P)      Do you currently have service(s) that help you manage your care at home? No (P)      Do you take prescription medications? Yes (P)      Do you have prescription coverage? Yes (P)      Do you have any problems affording any of your prescribed medications? No (P)      Is the patient taking medications as prescribed? yes (P)      How do you get to doctors appointments? family or friend will provide;car, drives self (P)      Are you on dialysis? No (P)      Do you take coumadin? No (P)      Discharge Plan A Home with family (P)      Discharge Plan B Home Health (P)      DME Needed Upon Discharge  other (see comments) (P)    tbd    Discharge Plan discussed with: Patient (P)         Physical Activity    On average, how many days per week do you engage in moderate to strenuous exercise (like a brisk walk)? 0 days (P)      On average, how many minutes do you engage in exercise at this level? 0 min (P)         Financial Resource Strain    How hard is it for you to pay for the very basics like food, housing, medical care, and heating? Not very hard (P)         Housing Stability    In the last 12 months, was there a time when you were not able to pay the mortgage or rent on time? No (P)      In the last 12 months, was there a time when you did not have a steady place to sleep or slept in a shelter (including now)? No (P)         Transportation Needs    In the past 12 months, has lack of transportation kept you from medical appointments or from getting medications? No (P)      In the past 12 months, has lack of transportation kept you from meetings, work, or from getting things needed for daily living? No (P)         Food Insecurity    Within the past 12 months, you worried that your food would run out before you got the money to buy more. Never true (P)      Within the past 12 months, the food you bought just didn't last and you didn't have money to get more. Never true (P)          Stress    Do you feel stress - tense, restless, nervous, or anxious, or unable to sleep at night because your mind is troubled all the time - these days? Not at all (P)         Social Connections    In a typical week, how many times do you talk on the phone with family, friends, or neighbors? More than three times a week (P)      How often do you get together with friends or relatives? More than three times a week (P)      How often do you attend [employer] or Islam services? More than 4 times per year (P)      Do you belong to any clubs or organizations such as [employer] groups, unions, fraternal or athletic groups, or school groups? No (P)      How often do you attend meetings of the clubs or organizations you belong to? Never (P)      Are you , , , , never , or living with a partner?  (P)         Alcohol Use    Q1: How often do you have a drink containing alcohol? Never (P)      Q2: How many drinks containing alcohol do you have on a typical day when you are drinking? Patient does not drink (P)      Q3: How often do you have six or more drinks on one occasion? Never (P)                    0750  Patient resting quietly in bed with daughter, Nidhi Park ([phone]), at the bedside when CM rounded via [employer]. Patient was admitted with flu B. Pox 98% on 2L O2 this AM. Pt does not use O2 at home.    Patient lives with her adult daughter, Delicia Park ([phone]), is independent of all ADLs, & denied the need for assistance with transportation at time of discharge.     0915  Hosp appt scheduled for the pt with LUIZ Hawthorne (PCP) on 1/8/2024 at 1300. Information added to the pt's discharge paperwork.       Will continue to follow.

## 2023-12-30 NOTE — SUBJECTIVE & OBJECTIVE
Interval History: awake and alert, still having distress coughing spell  Replace potassium,   Wbc down trending- and azithromycin.  Continue cough medication   Continue supplemental oxygen-wean as tolerated    Review of Systems   Constitutional:  Positive for fatigue and fever. Negative for appetite change and chills.   Respiratory:  Positive for cough and shortness of breath. Negative for choking.    Cardiovascular:  Negative for chest pain and leg swelling.   Gastrointestinal:  Negative for anal bleeding and blood in stool.   Genitourinary:  Negative for flank pain and hematuria.   Musculoskeletal:  Negative for back pain and gait problem.   Skin:  Negative for pallor and rash.   Allergic/Immunologic: Negative for food allergies and immunocompromised state.   Neurological:  Negative for seizures and speech difficulty.   Hematological:  Negative for adenopathy. Does not bruise/bleed easily.   Psychiatric/Behavioral:  Negative for decreased concentration and dysphoric mood.      Objective:     Vital Signs (Most Recent):  Temp: 98.1 °F (36.7 °C) (12/30/23 0732)  Pulse: 86 (12/30/23 0732)  Resp: 18 (12/30/23 0732)  BP: 134/81 (12/30/23 0732)  SpO2: 98 % (12/30/23 0732) Vital Signs (24h Range):  Temp:  [96.2 °F (35.7 °C)-98.9 °F (37.2 °C)] 98.1 °F (36.7 °C)  Pulse:  [] 86  Resp:  [14-33] 18  SpO2:  [89 %-98 %] 98 %  BP: (131-201)/(71-91) 134/81     Weight: 77.1 kg (170 lb)  Body mass index is 28.29 kg/m².    Intake/Output Summary (Last 24 hours) at 12/30/2023 1029  Last data filed at 12/30/2023 0705  Gross per 24 hour   Intake 2312.89 ml   Output 800 ml   Net 1512.89 ml         Physical Exam  Vitals reviewed.   Constitutional:       General: She is not in acute distress.     Appearance: She is not toxic-appearing.   HENT:      Mouth/Throat:      Pharynx: No oropharyngeal exudate or posterior oropharyngeal erythema.   Eyes:      General:         Right eye: No discharge.         Left eye: No discharge.  "  Cardiovascular:      Rate and Rhythm: Normal rate.      Heart sounds: No murmur heard.     No gallop.   Pulmonary:      Effort: No respiratory distress.      Breath sounds: Rales present.   Abdominal:      General: There is no distension.      Tenderness: There is no abdominal tenderness.   Musculoskeletal:         General: No swelling or deformity.      Cervical back: Normal range of motion. No rigidity.   Lymphadenopathy:      Cervical: No cervical adenopathy.   Skin:     Coloration: Skin is not jaundiced.      Findings: No bruising.   Neurological:      General: No focal deficit present.      Cranial Nerves: No cranial nerve deficit.      Motor: No weakness.   Psychiatric:         Mood and Affect: Mood normal.             Significant Labs: A1C: No results for input(s): "HGBA1C" in the last 4320 hours.  ABGs: No results for input(s): "PH", "PCO2", "HCO3", "POCSATURATED", "BE", "TOTALHB", "COHB", "METHB", "O2HB", "POCFIO2", "PO2" in the last 48 hours.  Blood Culture:   Recent Labs   Lab 12/29/23  1034 12/29/23  1038   LABBLOO No Growth to date No Growth to date     CBC:   Recent Labs   Lab 12/29/23  1038 12/30/23  0420   WBC 23.64* 14.55*   HGB 13.8 11.2*   HCT 40.0 32.5*   * 463*     CMP:   Recent Labs   Lab 12/29/23  1038 12/30/23  0420   * 137   K 3.5 3.2*   CL 96 104   CO2 26 24   * 96   BUN 10 6*   CREATININE 0.93 0.8   CALCIUM 9.3 8.7   PROT 8.4  --    ALBUMIN 4.1  --    BILITOT 0.8  --    ALKPHOS 80  --    AST 28  --    ALT 22  --    ANIONGAP 13 9     Cardiac Markers: No results for input(s): "CKMB", "MYOGLOBIN", "BNP", "TROPISTAT" in the last 48 hours.  Lactic Acid:   Recent Labs   Lab 12/29/23  1038   LACTATE 1.4     Lipase: No results for input(s): "LIPASE" in the last 48 hours.  Lipid Panel: No results for input(s): "CHOL", "HDL", "LDLCALC", "TRIG", "CHOLHDL" in the last 48 hours.  Magnesium:   Recent Labs   Lab 12/29/23  1038   MG 2.0     Troponin:   Recent Labs   Lab " "12/29/23  1038   TROPONINI <0.012     TSH: No results for input(s): "TSH" in the last 4320 hours.  Urine Culture: No results for input(s): "LABURIN" in the last 48 hours.  Urine Studies:   Recent Labs   Lab 12/29/23  1210   COLORU Yellow   APPEARANCEUA Clear   PHUR 7.0   SPECGRAV <=1.005*   PROTEINUA Negative   GLUCUA Negative   KETONESU Negative   BILIRUBINUA Negative   OCCULTUA 3+*   NITRITE Negative   UROBILINOGEN Negative   LEUKOCYTESUR Negative   RBCUA 12*       Significant Imaging: I have reviewed all pertinent imaging results/findings within the past 24 hours.  "

## 2023-12-30 NOTE — SUBJECTIVE & OBJECTIVE
Past Medical History:   Diagnosis Date    Arthritis     Cataract     Dry eye     Hypertension        Past Surgical History:   Procedure Laterality Date    ANTERIOR CRUCIATE LIGAMENT REPAIR      APPENDECTOMY      CATARACT EXTRACTION Right      SECTION      HYSTERECTOMY  age 36    d/t uterine fibroids.    KNEE ARTHROSCOPY Right 06/15/2017    per pt    OOPHORECTOMY Right        Review of patient's allergies indicates:   Allergen Reactions    Codeine sulfate Hives    Darvocet a500 [propoxyphene n-acetaminophen] Other (See Comments)    Darvon [propoxyphene] Other (See Comments)     hallucinations    Sulfa (sulfonamide antibiotics) Hives    Sulfamethoxazole-trimethoprim        No current facility-administered medications on file prior to encounter.     Current Outpatient Medications on File Prior to Encounter   Medication Sig    albuterol (PROVENTIL/VENTOLIN HFA) 90 mcg/actuation inhaler Inhale 1-2 puffs into the lungs every 6 (six) hours as needed for Wheezing or Shortness of Breath. Rescue    aspirin (ECOTRIN) 81 MG EC tablet Take 81 mg by mouth once daily.    latanoprost (XALATAN) 0.005 % ophthalmic solution Place 1 drop into both eyes once daily.    levothyroxine (EUTHYROX) 75 MCG tablet Take 1 tablet (75 mcg total) by mouth before breakfast.    lisinopriL-hydrochlorothiazide (PRINZIDE,ZESTORETIC) 20-12.5 mg per tablet Take 1 tablet by mouth once daily.    metoprolol tartrate (LOPRESSOR) 50 MG tablet Take 1 tablet (50 mg total) by mouth 2 (two) times daily.    sod sulf-pot chloride-mag sulf (SUTAB) 1.479-0.188- 0.225 gram tablet Take 12 tablets by mouth once daily. Take according to package instructions with indicated amount of water.     Family History       Problem Relation (Age of Onset)    Arthritis Mother    Cancer Father    Diabetes Mother, Sister    Heart disease Mother, Sister    Hyperlipidemia Mother, Sister          Tobacco Use    Smoking status: Never    Smokeless tobacco: Never   Substance and  Sexual Activity    Alcohol use: No     Alcohol/week: 0.0 standard drinks of alcohol    Drug use: No    Sexual activity: Never     Review of Systems   Constitutional:  Positive for fatigue and fever. Negative for appetite change and chills.   HENT:  Negative for ear discharge and ear pain.    Respiratory:  Positive for cough and shortness of breath. Negative for choking.    Cardiovascular:  Negative for chest pain and leg swelling.   Gastrointestinal:  Negative for anal bleeding and blood in stool.   Endocrine: Negative for polydipsia and polyphagia.   Genitourinary:  Negative for flank pain and hematuria.   Musculoskeletal:  Negative for back pain and gait problem.   Skin:  Negative for pallor and rash.   Allergic/Immunologic: Negative for food allergies and immunocompromised state.   Neurological:  Negative for seizures and speech difficulty.   Hematological:  Negative for adenopathy. Does not bruise/bleed easily.   Psychiatric/Behavioral:  Negative for decreased concentration and dysphoric mood.      Objective:     Vital Signs (Most Recent):  Temp: 97.5 °F (36.4 °C) (12/29/23 2313)  Pulse: 91 (12/29/23 2313)  Resp: (!) 21 (12/29/23 2313)  BP: (!) 156/83 (12/29/23 2313)  SpO2: 98 % (12/29/23 2313) Vital Signs (24h Range):  Temp:  [96.2 °F (35.7 °C)-100.9 °F (38.3 °C)] 97.5 °F (36.4 °C)  Pulse:  [] 91  Resp:  [14-33] 21  SpO2:  [89 %-98 %] 98 %  BP: (140-201)/(71-91) 156/83     Weight: 77.1 kg (170 lb)  Body mass index is 28.29 kg/m².     Physical Exam  Vitals reviewed.   Constitutional:       General: She is not in acute distress.     Appearance: She is not toxic-appearing.   HENT:      Right Ear: There is no impacted cerumen.      Left Ear: There is no impacted cerumen.      Nose: No congestion or rhinorrhea.      Mouth/Throat:      Pharynx: No oropharyngeal exudate or posterior oropharyngeal erythema.   Eyes:      General:         Right eye: No discharge.         Left eye: No discharge.   Cardiovascular:     "  Rate and Rhythm: Normal rate.      Heart sounds: No murmur heard.     No gallop.   Pulmonary:      Effort: Respiratory distress present.      Breath sounds: Rales present.   Abdominal:      General: There is no distension.      Tenderness: There is no abdominal tenderness.   Musculoskeletal:         General: No swelling or deformity.      Cervical back: No rigidity.   Lymphadenopathy:      Cervical: No cervical adenopathy.   Skin:     Coloration: Skin is not jaundiced.      Findings: No bruising.   Neurological:      General: No focal deficit present.      Cranial Nerves: No cranial nerve deficit.      Motor: No weakness.   Psychiatric:         Mood and Affect: Mood normal.                Significant Labs: All pertinent labs within the past 24 hours have been reviewed.  Bilirubin:   Recent Labs   Lab 12/29/23  1038   BILITOT 0.8     Blood Culture: No results for input(s): "LABBLOO" in the last 48 hours.  BMP:   Recent Labs   Lab 12/29/23  1038   *   *   K 3.5   CL 96   CO2 26   BUN 10   CREATININE 0.93   CALCIUM 9.3   MG 2.0     CBC:   Recent Labs   Lab 12/29/23  1038   WBC 23.64*   HGB 13.8   HCT 40.0   *     CMP:   Recent Labs   Lab 12/29/23  1038   *   K 3.5   CL 96   CO2 26   *   BUN 10   CREATININE 0.93   CALCIUM 9.3   PROT 8.4   ALBUMIN 4.1   BILITOT 0.8   ALKPHOS 80   AST 28   ALT 22   ANIONGAP 13     Lactic Acid:   Recent Labs   Lab 12/29/23  1038   LACTATE 1.4     Lipid Panel: No results for input(s): "CHOL", "HDL", "LDLCALC", "TRIG", "CHOLHDL" in the last 48 hours.  Magnesium:   Recent Labs   Lab 12/29/23  1038   MG 2.0     Troponin:   Recent Labs   Lab 12/29/23  1038   TROPONINI <0.012     TSH: No results for input(s): "TSH" in the last 4320 hours.  Urine Culture: No results for input(s): "LABURIN" in the last 48 hours.    Significant Imaging: I have reviewed all pertinent imaging results/findings within the past 24 hours.  I have reviewed and interpreted all pertinent " imaging results/findings within the past 24 hours.

## 2023-12-30 NOTE — ASSESSMENT & PLAN NOTE
Chest x-ray shows right lobe hazy opacity consistent with pneumonia  Leukocytosis likely is due to steroids although may have component of infection    Plan:  Continue antibiotics  Continue oxygen as needed  Continue p.r.n. antitussives

## 2023-12-30 NOTE — ASSESSMENT & PLAN NOTE
Patient has hyponatremia which is controlled,We will aim to correct the sodium by 4-6mEq in 24 hours. We will monitor sodium Daily. The hyponatremia is due to Dehydration/hypovolemia. We will obtain the following studies:  We will hold off. We will treat the hyponatremia with IV fluids as follows:  1 L. The patient's sodium results have been reviewed and are listed below.  Recent Labs   Lab 12/29/23  1038   *

## 2023-12-31 LAB
ANION GAP SERPL CALC-SCNC: 11 MMOL/L (ref 8–16)
BASOPHILS # BLD AUTO: 0.07 K/UL (ref 0–0.2)
BASOPHILS NFR BLD: 0.5 % (ref 0–1.9)
BUN SERPL-MCNC: 5 MG/DL (ref 8–23)
CALCIUM SERPL-MCNC: 9.6 MG/DL (ref 8.7–10.5)
CHLORIDE SERPL-SCNC: 101 MMOL/L (ref 95–110)
CO2 SERPL-SCNC: 26 MMOL/L (ref 23–29)
CREAT SERPL-MCNC: 0.7 MG/DL (ref 0.5–1.4)
DIFFERENTIAL METHOD BLD: ABNORMAL
EOSINOPHIL # BLD AUTO: 0.3 K/UL (ref 0–0.5)
EOSINOPHIL NFR BLD: 1.9 % (ref 0–8)
ERYTHROCYTE [DISTWIDTH] IN BLOOD BY AUTOMATED COUNT: 13.6 % (ref 11.5–14.5)
EST. GFR  (NO RACE VARIABLE): >60 ML/MIN/1.73 M^2
GLUCOSE SERPL-MCNC: 111 MG/DL (ref 70–110)
HCT VFR BLD AUTO: 35.4 % (ref 37–48.5)
HGB BLD-MCNC: 12.1 G/DL (ref 12–16)
IMM GRANULOCYTES # BLD AUTO: 0.18 K/UL (ref 0–0.04)
IMM GRANULOCYTES NFR BLD AUTO: 1.3 % (ref 0–0.5)
LYMPHOCYTES # BLD AUTO: 3 K/UL (ref 1–4.8)
LYMPHOCYTES NFR BLD: 21 % (ref 18–48)
MCH RBC QN AUTO: 28.5 PG (ref 27–31)
MCHC RBC AUTO-ENTMCNC: 34.2 G/DL (ref 32–36)
MCV RBC AUTO: 83 FL (ref 82–98)
MONOCYTES # BLD AUTO: 1.7 K/UL (ref 0.3–1)
MONOCYTES NFR BLD: 12.3 % (ref 4–15)
NEUTROPHILS # BLD AUTO: 8.9 K/UL (ref 1.8–7.7)
NEUTROPHILS NFR BLD: 63 % (ref 38–73)
NRBC BLD-RTO: 0 /100 WBC
PLATELET # BLD AUTO: 549 K/UL (ref 150–450)
PMV BLD AUTO: 9.4 FL (ref 9.2–12.9)
POTASSIUM SERPL-SCNC: 3.5 MMOL/L (ref 3.5–5.1)
RBC # BLD AUTO: 4.25 M/UL (ref 4–5.4)
SODIUM SERPL-SCNC: 138 MMOL/L (ref 136–145)
WBC # BLD AUTO: 14.15 K/UL (ref 3.9–12.7)

## 2023-12-31 PROCEDURE — 80048 BASIC METABOLIC PNL TOTAL CA: CPT | Performed by: STUDENT IN AN ORGANIZED HEALTH CARE EDUCATION/TRAINING PROGRAM

## 2023-12-31 PROCEDURE — 99900035 HC TECH TIME PER 15 MIN (STAT)

## 2023-12-31 PROCEDURE — 25000003 PHARM REV CODE 250: Performed by: STUDENT IN AN ORGANIZED HEALTH CARE EDUCATION/TRAINING PROGRAM

## 2023-12-31 PROCEDURE — 27000207 HC ISOLATION

## 2023-12-31 PROCEDURE — 94761 N-INVAS EAR/PLS OXIMETRY MLT: CPT

## 2023-12-31 PROCEDURE — 36415 COLL VENOUS BLD VENIPUNCTURE: CPT | Performed by: STUDENT IN AN ORGANIZED HEALTH CARE EDUCATION/TRAINING PROGRAM

## 2023-12-31 PROCEDURE — 25000003 PHARM REV CODE 250: Performed by: FAMILY MEDICINE

## 2023-12-31 PROCEDURE — 11000001 HC ACUTE MED/SURG PRIVATE ROOM

## 2023-12-31 PROCEDURE — 63600175 PHARM REV CODE 636 W HCPCS: Performed by: STUDENT IN AN ORGANIZED HEALTH CARE EDUCATION/TRAINING PROGRAM

## 2023-12-31 PROCEDURE — 85025 COMPLETE CBC W/AUTO DIFF WBC: CPT | Performed by: STUDENT IN AN ORGANIZED HEALTH CARE EDUCATION/TRAINING PROGRAM

## 2023-12-31 PROCEDURE — 27000221 HC OXYGEN, UP TO 24 HOURS

## 2023-12-31 RX ORDER — GUAIFENESIN 100 MG/5ML
200 SOLUTION ORAL EVERY 4 HOURS PRN
Status: DISCONTINUED | OUTPATIENT
Start: 2023-12-31 | End: 2024-01-01 | Stop reason: HOSPADM

## 2023-12-31 RX ORDER — HYDROCHLOROTHIAZIDE 25 MG/1
25 TABLET ORAL DAILY
Status: DISCONTINUED | OUTPATIENT
Start: 2024-01-01 | End: 2024-01-01 | Stop reason: HOSPADM

## 2023-12-31 RX ADMIN — HEPARIN SODIUM 5000 UNITS: 5000 INJECTION INTRAVENOUS; SUBCUTANEOUS at 02:12

## 2023-12-31 RX ADMIN — GUAIFENESIN 200 MG: 200 SOLUTION ORAL at 10:12

## 2023-12-31 RX ADMIN — BENZONATATE 100 MG: 100 CAPSULE ORAL at 10:12

## 2023-12-31 RX ADMIN — HEPARIN SODIUM 5000 UNITS: 5000 INJECTION INTRAVENOUS; SUBCUTANEOUS at 10:12

## 2023-12-31 RX ADMIN — OSELTAMIVIR PHOSPHATE 75 MG: 75 CAPSULE ORAL at 10:12

## 2023-12-31 RX ADMIN — AZITHROMYCIN MONOHYDRATE 500 MG: 500 INJECTION, POWDER, LYOPHILIZED, FOR SOLUTION INTRAVENOUS at 02:12

## 2023-12-31 RX ADMIN — METOPROLOL TARTRATE 50 MG: 50 TABLET, FILM COATED ORAL at 09:12

## 2023-12-31 RX ADMIN — HEPARIN SODIUM 5000 UNITS: 5000 INJECTION INTRAVENOUS; SUBCUTANEOUS at 05:12

## 2023-12-31 RX ADMIN — BENZONATATE 100 MG: 100 CAPSULE ORAL at 09:12

## 2023-12-31 RX ADMIN — LATANOPROST 1 DROP: 50 SOLUTION/ DROPS OPHTHALMIC at 09:12

## 2023-12-31 RX ADMIN — LISINOPRIL 20 MG: 20 TABLET ORAL at 09:12

## 2023-12-31 RX ADMIN — METOPROLOL TARTRATE 50 MG: 50 TABLET, FILM COATED ORAL at 10:12

## 2023-12-31 RX ADMIN — LEVOTHYROXINE SODIUM 75 MCG: 75 TABLET ORAL at 05:12

## 2023-12-31 RX ADMIN — CEFTRIAXONE SODIUM 1 G: 1 INJECTION, POWDER, FOR SOLUTION INTRAMUSCULAR; INTRAVENOUS at 12:12

## 2023-12-31 RX ADMIN — GUAIFENESIN 200 MG: 200 SOLUTION ORAL at 03:12

## 2023-12-31 RX ADMIN — HYDROCHLOROTHIAZIDE 12.5 MG: 12.5 TABLET ORAL at 09:12

## 2023-12-31 RX ADMIN — OSELTAMIVIR PHOSPHATE 75 MG: 75 CAPSULE ORAL at 09:12

## 2023-12-31 RX ADMIN — ASPIRIN 81 MG: 81 TABLET, COATED ORAL at 09:12

## 2023-12-31 NOTE — NURSING
Patient reports starting to have severe pain to right arm and some swelling around elbow area since administration of azithromycin. Patient received previous doses without any complications. No swelling/redness/warmth noted to IV site, no signs of infiltration. Notified Dr. Prasad. Ice pack applied to area, pain improving once medication was stopped.

## 2023-12-31 NOTE — PROGRESS NOTES
[facility] Medicine  Progress Note    Patient Name: Bri Park  MRN: [id number]  Patient Class: IP- Inpatient   Admission Date: 12/29/2023  Length of Stay: 2 days  Attending Physician: Brenda Hutchison*  Primary Care Provider: Margaret Hawthorne NP        Subjective:     Principal Problem:Influenza B        HPI:  Bri Park is a 73-year-old female with a past medical history of hypertension who presents with flu-like symptoms.    Patient states that she has had productive cough over the past few days with associated body aches, fever, generalized weakness and shortness of breath.  She states that her symptoms initially started around 12/16 and she was evaluated in the ED on 12/19 with negative influenza, COVID, strep pharyngitis tests.  Other imaging was unremarkable.  She was discharged with steroids and albuterol.  She returns today with worsening of her symptoms.    Triage vitals were significant for tachycardia, hypertension, tachypnea, slight hypoxia.  Physical exam was significant for tachycardia, tachypnea, rales.    CBC was significant for leukocytosis, thrombocytosis.  CMP was significant for slight hyponatremia.  Pro BNP, troponin, lactate was unremarkable.  Patient became positive for influenza B.    UA was fairly unremarkable for leukocytes although she was noted to have occult blood.  Blood cultures were pending.    Chest x-ray showed right lower lobe infiltrate consistent with pneumonia.    Patient was given a dose of IV antibiotics.    Patient admitted for influenza and possible bacterial pneumonia.    Overview/Hospital Course:  No notes on file    Interval History: awake and alert, still having coughing distress  Replace potassium,   Wbc down trending- and azithromycin.  Continue cough medication   Continue supplemental oxygen-wean as tolerated    Review of Systems   Constitutional:  Positive for fatigue. Negative for appetite change, chills and fever.   Respiratory:   Positive for cough. Negative for choking and shortness of breath.    Cardiovascular:  Negative for chest pain and leg swelling.   Gastrointestinal:  Negative for anal bleeding and blood in stool.   Genitourinary:  Negative for flank pain and hematuria.   Musculoskeletal:  Negative for back pain and gait problem.   Skin:  Negative for pallor and rash.   Allergic/Immunologic: Negative for food allergies and immunocompromised state.   Neurological:  Negative for seizures and speech difficulty.   Hematological:  Negative for adenopathy. Does not bruise/bleed easily.   Psychiatric/Behavioral:  Negative for decreased concentration and dysphoric mood.      Objective:     Vital Signs (Most Recent):  Temp: 98.4 °F (36.9 °C) (12/31/23 1120)  Pulse: 75 (12/31/23 1120)  Resp: 18 (12/31/23 1120)  BP: (!) 147/85 (12/31/23 1120)  SpO2: 95 % (12/31/23 1120) Vital Signs (24h Range):  Temp:  [96.3 °F (35.7 °C)-98.6 °F (37 °C)] 98.4 °F (36.9 °C)  Pulse:  [69-88] 75  Resp:  [16-18] 18  SpO2:  [91 %-97 %] 95 %  BP: (143-166)/(69-85) 147/85     Weight: 77.1 kg (170 lb)  Body mass index is 28.29 kg/m².    Intake/Output Summary (Last 24 hours) at 12/31/2023 1154  Last data filed at 12/31/2023 0525  Gross per 24 hour   Intake 656.78 ml   Output 1100 ml   Net -443.22 ml           Physical Exam  Vitals reviewed.   Constitutional:       General: She is not in acute distress.     Appearance: She is not toxic-appearing.   HENT:      Mouth/Throat:      Pharynx: No oropharyngeal exudate or posterior oropharyngeal erythema.   Eyes:      General:         Right eye: No discharge.         Left eye: No discharge.   Cardiovascular:      Rate and Rhythm: Normal rate.      Heart sounds: No murmur heard.     No gallop.   Pulmonary:      Effort: No respiratory distress.      Breath sounds: Rales present.   Abdominal:      General: There is no distension.      Tenderness: There is no abdominal tenderness.   Musculoskeletal:         General: No swelling or  "deformity.      Cervical back: Normal range of motion. No rigidity.   Lymphadenopathy:      Cervical: No cervical adenopathy.   Skin:     Coloration: Skin is not jaundiced.      Findings: No bruising.   Neurological:      General: No focal deficit present.      Cranial Nerves: No cranial nerve deficit.      Motor: No weakness.   Psychiatric:         Mood and Affect: Mood normal.             Significant Labs: A1C: No results for input(s): "HGBA1C" in the last 4320 hours.  ABGs: No results for input(s): "PH", "PCO2", "HCO3", "POCSATURATED", "BE", "TOTALHB", "COHB", "METHB", "O2HB", "POCFIO2", "PO2" in the last 48 hours.  Blood Culture:   No results for input(s): "LABBLOO" in the last 48 hours.    CBC:   Recent Labs   Lab 12/30/23 0420 12/31/23  0307   WBC 14.55* 14.15*   HGB 11.2* 12.1   HCT 32.5* 35.4*   * 549*       CMP:   Recent Labs   Lab 12/30/23  0420 12/31/23  0307    138   K 3.2* 3.5    101   CO2 24 26   GLU 96 111*   BUN 6* 5*   CREATININE 0.8 0.7   CALCIUM 8.7 9.6   ANIONGAP 9 11       Cardiac Markers: No results for input(s): "CKMB", "MYOGLOBIN", "BNP", "TROPISTAT" in the last 48 hours.  Lactic Acid:   No results for input(s): "LACTATE" in the last 48 hours.    Lipase: No results for input(s): "LIPASE" in the last 48 hours.  Lipid Panel: No results for input(s): "CHOL", "HDL", "LDLCALC", "TRIG", "CHOLHDL" in the last 48 hours.  Magnesium:   No results for input(s): "MG" in the last 48 hours.    Troponin:   No results for input(s): "TROPONINI", "TROPONINIHS" in the last 48 hours.    TSH: No results for input(s): "TSH" in the last 4320 hours.  Urine Culture: No results for input(s): "LABURIN" in the last 48 hours.  Urine Studies:   Recent Labs   Lab 12/29/23  1210   COLORU Yellow   APPEARANCEUA Clear   PHUR 7.0   SPECGRAV <=1.005*   PROTEINUA Negative   GLUCUA Negative   KETONESU Negative   BILIRUBINUA Negative   OCCULTUA 3+*   NITRITE Negative   UROBILINOGEN Negative   LEUKOCYTESUR " Negative   RBCUA 12*         Significant Imaging: I have reviewed all pertinent imaging results/findings within the past 24 hours.    Assessment/Plan:      * Influenza B  Patient positive for influenza B    Plan:  Continue with Tamiflu for 5 day total course      Hyponatremia  Patient has hyponatremia which is controlled,We will aim to correct the sodium by 4-6mEq in 24 hours. We will monitor sodium Daily. The hyponatremia is due to Dehydration/hypovolemia. We will obtain the following studies:  We will hold off. We will treat the hyponatremia with IV fluids as follows:  1 L. The patient's sodium results have been reviewed and are listed below.  Recent Labs   Lab 12/29/23  1038   *       Thrombocytosis  Likely acute inflammatory marker      Pneumonia  Chest x-ray shows right lobe hazy opacity consistent with pneumonia  Leukocytosis likely is due to steroids although may have component of infection    Plan:  Continue antibiotics  Continue oxygen as needed  Continue p.r.n. antitussives        HTN (hypertension)  Chronic, controlled. Latest blood pressure and vitals reviewed-     Temp:  [96.2 °F (35.7 °C)-100.9 °F (38.3 °C)]   Pulse:  []   Resp:  [14-33]   BP: (140-201)/(71-91)   SpO2:  [89 %-98 %] .   Home meds for hypertension were reviewed and noted below.   Hypertension Medications               lisinopriL-hydrochlorothiazide (PRINZIDE,ZESTORETIC) 20-12.5 mg per tablet Take 1 tablet by mouth once daily.    metoprolol tartrate (LOPRESSOR) 50 MG tablet Take 1 tablet (50 mg total) by mouth 2 (two) times daily.            While in the hospital, will manage blood pressure as follows; Continue home antihypertensive regimen    Will utilize p.r.n. blood pressure medication only if patient's blood pressure greater than 180/110 and she develops symptoms such as worsening chest pain or shortness of breath.      VTE Risk Mitigation (From admission, onward)           Ordered     heparin (porcine) injection 5,000  Units  Every 8 hours         12/29/23 1734     IP VTE HIGH RISK PATIENT  Once         12/29/23 1734     Place sequential compression device  Until discontinued         12/29/23 1734                    Discharge Planning   JCARLOS: 1/1/2024     Code Status: Full Code   Is the patient medically ready for discharge?:     Reason for patient still in hospital (select all that apply): Patient trending condition  Discharge Plan A: Home with family                  Brenda SHIRAZ Hutchison MD  Department of [facility] Medicine   [place] [facility]

## 2023-12-31 NOTE — NURSING
Patient having frequent cough. SOB reported during cough spells. Oxygen therapy maintained. Benzonatate capsule administered prn for cough, no improvement noted. Notified Dr. Prasad, orders placed for cough syrup prn.

## 2023-12-31 NOTE — SUBJECTIVE & OBJECTIVE
Interval History: awake and alert, still having coughing distress  Replace potassium,   Wbc down trending- and azithromycin.  Continue cough medication   Continue supplemental oxygen-wean as tolerated    Review of Systems   Constitutional:  Positive for fatigue. Negative for appetite change, chills and fever.   Respiratory:  Positive for cough. Negative for choking and shortness of breath.    Cardiovascular:  Negative for chest pain and leg swelling.   Gastrointestinal:  Negative for anal bleeding and blood in stool.   Genitourinary:  Negative for flank pain and hematuria.   Musculoskeletal:  Negative for back pain and gait problem.   Skin:  Negative for pallor and rash.   Allergic/Immunologic: Negative for food allergies and immunocompromised state.   Neurological:  Negative for seizures and speech difficulty.   Hematological:  Negative for adenopathy. Does not bruise/bleed easily.   Psychiatric/Behavioral:  Negative for decreased concentration and dysphoric mood.      Objective:     Vital Signs (Most Recent):  Temp: 98.4 °F (36.9 °C) (12/31/23 1120)  Pulse: 75 (12/31/23 1120)  Resp: 18 (12/31/23 1120)  BP: (!) 147/85 (12/31/23 1120)  SpO2: 95 % (12/31/23 1120) Vital Signs (24h Range):  Temp:  [96.3 °F (35.7 °C)-98.6 °F (37 °C)] 98.4 °F (36.9 °C)  Pulse:  [69-88] 75  Resp:  [16-18] 18  SpO2:  [91 %-97 %] 95 %  BP: (143-166)/(69-85) 147/85     Weight: 77.1 kg (170 lb)  Body mass index is 28.29 kg/m².    Intake/Output Summary (Last 24 hours) at 12/31/2023 1154  Last data filed at 12/31/2023 0525  Gross per 24 hour   Intake 656.78 ml   Output 1100 ml   Net -443.22 ml           Physical Exam  Vitals reviewed.   Constitutional:       General: She is not in acute distress.     Appearance: She is not toxic-appearing.   HENT:      Mouth/Throat:      Pharynx: No oropharyngeal exudate or posterior oropharyngeal erythema.   Eyes:      General:         Right eye: No discharge.         Left eye: No discharge.   Cardiovascular:  "     Rate and Rhythm: Normal rate.      Heart sounds: No murmur heard.     No gallop.   Pulmonary:      Effort: No respiratory distress.      Breath sounds: Rales present.   Abdominal:      General: There is no distension.      Tenderness: There is no abdominal tenderness.   Musculoskeletal:         General: No swelling or deformity.      Cervical back: Normal range of motion. No rigidity.   Lymphadenopathy:      Cervical: No cervical adenopathy.   Skin:     Coloration: Skin is not jaundiced.      Findings: No bruising.   Neurological:      General: No focal deficit present.      Cranial Nerves: No cranial nerve deficit.      Motor: No weakness.   Psychiatric:         Mood and Affect: Mood normal.             Significant Labs: A1C: No results for input(s): "HGBA1C" in the last 4320 hours.  ABGs: No results for input(s): "PH", "PCO2", "HCO3", "POCSATURATED", "BE", "TOTALHB", "COHB", "METHB", "O2HB", "POCFIO2", "PO2" in the last 48 hours.  Blood Culture:   No results for input(s): "LABBLOO" in the last 48 hours.    CBC:   Recent Labs   Lab 12/30/23  0420 12/31/23  0307   WBC 14.55* 14.15*   HGB 11.2* 12.1   HCT 32.5* 35.4*   * 549*       CMP:   Recent Labs   Lab 12/30/23  0420 12/31/23  0307    138   K 3.2* 3.5    101   CO2 24 26   GLU 96 111*   BUN 6* 5*   CREATININE 0.8 0.7   CALCIUM 8.7 9.6   ANIONGAP 9 11       Cardiac Markers: No results for input(s): "CKMB", "MYOGLOBIN", "BNP", "TROPISTAT" in the last 48 hours.  Lactic Acid:   No results for input(s): "LACTATE" in the last 48 hours.    Lipase: No results for input(s): "LIPASE" in the last 48 hours.  Lipid Panel: No results for input(s): "CHOL", "HDL", "LDLCALC", "TRIG", "CHOLHDL" in the last 48 hours.  Magnesium:   No results for input(s): "MG" in the last 48 hours.    Troponin:   No results for input(s): "TROPONINI", "TROPONINIHS" in the last 48 hours.    TSH: No results for input(s): "TSH" in the last 4320 hours.  Urine Culture: No results " "for input(s): "LABURIN" in the last 48 hours.  Urine Studies:   Recent Labs   Lab 12/29/23  1210   COLORU Yellow   APPEARANCEUA Clear   PHUR 7.0   SPECGRAV <=1.005*   PROTEINUA Negative   GLUCUA Negative   KETONESU Negative   BILIRUBINUA Negative   OCCULTUA 3+*   NITRITE Negative   UROBILINOGEN Negative   LEUKOCYTESUR Negative   RBCUA 12*         Significant Imaging: I have reviewed all pertinent imaging results/findings within the past 24 hours.  "

## 2024-01-01 VITALS
OXYGEN SATURATION: 95 % | DIASTOLIC BLOOD PRESSURE: 75 MMHG | RESPIRATION RATE: 18 BRPM | HEART RATE: 82 BPM | HEIGHT: 65 IN | SYSTOLIC BLOOD PRESSURE: 146 MMHG | TEMPERATURE: 99 F | WEIGHT: 170 LBS | BODY MASS INDEX: 28.32 KG/M2

## 2024-01-01 LAB
ANION GAP SERPL CALC-SCNC: 12 MMOL/L (ref 8–16)
BASOPHILS # BLD AUTO: 0.09 K/UL (ref 0–0.2)
BASOPHILS NFR BLD: 0.9 % (ref 0–1.9)
BUN SERPL-MCNC: 6 MG/DL (ref 8–23)
CALCIUM SERPL-MCNC: 9.5 MG/DL (ref 8.7–10.5)
CHLORIDE SERPL-SCNC: 100 MMOL/L (ref 95–110)
CO2 SERPL-SCNC: 26 MMOL/L (ref 23–29)
CREAT SERPL-MCNC: 0.7 MG/DL (ref 0.5–1.4)
DIFFERENTIAL METHOD BLD: ABNORMAL
EOSINOPHIL # BLD AUTO: 0.3 K/UL (ref 0–0.5)
EOSINOPHIL NFR BLD: 3.1 % (ref 0–8)
ERYTHROCYTE [DISTWIDTH] IN BLOOD BY AUTOMATED COUNT: 13.6 % (ref 11.5–14.5)
EST. GFR  (NO RACE VARIABLE): >60 ML/MIN/1.73 M^2
GLUCOSE SERPL-MCNC: 118 MG/DL (ref 70–110)
HCT VFR BLD AUTO: 35.6 % (ref 37–48.5)
HGB BLD-MCNC: 12.1 G/DL (ref 12–16)
IMM GRANULOCYTES # BLD AUTO: 0.15 K/UL (ref 0–0.04)
IMM GRANULOCYTES NFR BLD AUTO: 1.5 % (ref 0–0.5)
LYMPHOCYTES # BLD AUTO: 2.4 K/UL (ref 1–4.8)
LYMPHOCYTES NFR BLD: 23.5 % (ref 18–48)
MCH RBC QN AUTO: 28.6 PG (ref 27–31)
MCHC RBC AUTO-ENTMCNC: 34 G/DL (ref 32–36)
MCV RBC AUTO: 84 FL (ref 82–98)
MONOCYTES # BLD AUTO: 1.1 K/UL (ref 0.3–1)
MONOCYTES NFR BLD: 11.1 % (ref 4–15)
NEUTROPHILS # BLD AUTO: 6.1 K/UL (ref 1.8–7.7)
NEUTROPHILS NFR BLD: 59.9 % (ref 38–73)
NRBC BLD-RTO: 0 /100 WBC
PLATELET # BLD AUTO: 594 K/UL (ref 150–450)
PMV BLD AUTO: 9.6 FL (ref 9.2–12.9)
POTASSIUM SERPL-SCNC: 3.9 MMOL/L (ref 3.5–5.1)
RBC # BLD AUTO: 4.23 M/UL (ref 4–5.4)
SODIUM SERPL-SCNC: 138 MMOL/L (ref 136–145)
WBC # BLD AUTO: 10.2 K/UL (ref 3.9–12.7)

## 2024-01-01 PROCEDURE — 27000221 HC OXYGEN, UP TO 24 HOURS

## 2024-01-01 PROCEDURE — 63600175 PHARM REV CODE 636 W HCPCS: Performed by: STUDENT IN AN ORGANIZED HEALTH CARE EDUCATION/TRAINING PROGRAM

## 2024-01-01 PROCEDURE — 25000003 PHARM REV CODE 250: Performed by: FAMILY MEDICINE

## 2024-01-01 PROCEDURE — 85025 COMPLETE CBC W/AUTO DIFF WBC: CPT | Performed by: STUDENT IN AN ORGANIZED HEALTH CARE EDUCATION/TRAINING PROGRAM

## 2024-01-01 PROCEDURE — 63700000 PHARM REV CODE 250 ALT 637 W/O HCPCS: Performed by: FAMILY MEDICINE

## 2024-01-01 PROCEDURE — 94761 N-INVAS EAR/PLS OXIMETRY MLT: CPT

## 2024-01-01 PROCEDURE — 80048 BASIC METABOLIC PNL TOTAL CA: CPT | Performed by: STUDENT IN AN ORGANIZED HEALTH CARE EDUCATION/TRAINING PROGRAM

## 2024-01-01 PROCEDURE — 36415 COLL VENOUS BLD VENIPUNCTURE: CPT | Performed by: STUDENT IN AN ORGANIZED HEALTH CARE EDUCATION/TRAINING PROGRAM

## 2024-01-01 PROCEDURE — 25000003 PHARM REV CODE 250: Performed by: STUDENT IN AN ORGANIZED HEALTH CARE EDUCATION/TRAINING PROGRAM

## 2024-01-01 PROCEDURE — 99900035 HC TECH TIME PER 15 MIN (STAT)

## 2024-01-01 RX ORDER — BENZONATATE 100 MG/1
100 CAPSULE ORAL 3 TIMES DAILY PRN
Qty: 20 CAPSULE | Refills: 1 | Status: SHIPPED | OUTPATIENT
Start: 2024-01-01 | End: 2024-01-11

## 2024-01-01 RX ORDER — AZITHROMYCIN 500 MG/1
500 TABLET, FILM COATED ORAL DAILY
Qty: 3 TABLET | Refills: 0 | Status: SHIPPED | OUTPATIENT
Start: 2024-01-01

## 2024-01-01 RX ORDER — OSELTAMIVIR PHOSPHATE 75 MG/1
75 CAPSULE ORAL 2 TIMES DAILY
Qty: 4 CAPSULE | Refills: 0 | Status: SHIPPED | OUTPATIENT
Start: 2024-01-01 | End: 2024-01-03

## 2024-01-01 RX ORDER — GUAIFENESIN 100 MG/5ML
200 SOLUTION ORAL EVERY 4 HOURS PRN
Qty: 118 ML | Refills: 0 | Status: SHIPPED | OUTPATIENT
Start: 2024-01-01 | End: 2024-01-11

## 2024-01-01 RX ORDER — AZITHROMYCIN 250 MG/1
250 TABLET, FILM COATED ORAL DAILY
Status: DISCONTINUED | OUTPATIENT
Start: 2024-01-01 | End: 2024-01-01 | Stop reason: HOSPADM

## 2024-01-01 RX ORDER — AZITHROMYCIN 250 MG/1
250 TABLET, FILM COATED ORAL DAILY
Status: DISCONTINUED | OUTPATIENT
Start: 2024-01-02 | End: 2024-01-01

## 2024-01-01 RX ORDER — POLYETHYLENE GLYCOL 3350 17 G/17G
17 POWDER, FOR SOLUTION ORAL DAILY
Qty: 30 EACH | Refills: 0 | Status: SHIPPED | OUTPATIENT
Start: 2024-01-02

## 2024-01-01 RX ADMIN — LISINOPRIL 20 MG: 20 TABLET ORAL at 10:01

## 2024-01-01 RX ADMIN — OSELTAMIVIR PHOSPHATE 75 MG: 75 CAPSULE ORAL at 10:01

## 2024-01-01 RX ADMIN — HEPARIN SODIUM 5000 UNITS: 5000 INJECTION INTRAVENOUS; SUBCUTANEOUS at 03:01

## 2024-01-01 RX ADMIN — BENZONATATE 100 MG: 100 CAPSULE ORAL at 10:01

## 2024-01-01 RX ADMIN — ASPIRIN 81 MG: 81 TABLET, COATED ORAL at 10:01

## 2024-01-01 RX ADMIN — HEPARIN SODIUM 5000 UNITS: 5000 INJECTION INTRAVENOUS; SUBCUTANEOUS at 05:01

## 2024-01-01 RX ADMIN — LATANOPROST 1 DROP: 50 SOLUTION/ DROPS OPHTHALMIC at 10:01

## 2024-01-01 RX ADMIN — HYDROCHLOROTHIAZIDE 25 MG: 25 TABLET ORAL at 10:01

## 2024-01-01 RX ADMIN — GUAIFENESIN 200 MG: 200 SOLUTION ORAL at 03:01

## 2024-01-01 RX ADMIN — CEFTRIAXONE SODIUM 1 G: 1 INJECTION, POWDER, FOR SOLUTION INTRAMUSCULAR; INTRAVENOUS at 12:01

## 2024-01-01 RX ADMIN — LEVOTHYROXINE SODIUM 75 MCG: 75 TABLET ORAL at 05:01

## 2024-01-01 RX ADMIN — GUAIFENESIN 200 MG: 200 SOLUTION ORAL at 10:01

## 2024-01-01 RX ADMIN — METOPROLOL TARTRATE 50 MG: 50 TABLET, FILM COATED ORAL at 10:01

## 2024-01-01 RX ADMIN — BENZONATATE 100 MG: 100 CAPSULE ORAL at 03:01

## 2024-01-01 RX ADMIN — AZITHROMYCIN DIHYDRATE 250 MG: 250 TABLET ORAL at 03:01

## 2024-01-01 NOTE — PLAN OF CARE
VN note: VN completed AVS and attachments and notified bedside nurse, [place]. Will cont to be available and intervene prn.

## 2024-01-01 NOTE — NURSING
Home Oxygen Evaluation    Date Performed: 2024    1) Patient's Home O2 Sat on room air, while at rest: 90 - 91%        If O2 sats on room air at rest are 88% or below, patient qualifies. No additional testing needed. Document N/A in steps 2 and 3. If 89% or above, complete steps 2.      2) Patient's O2 Sat on room air while exercisin - 97%        If O2 sats on room air while exercising remain 89% or above patient does not qualify, no further testing needed Document N/A in step 3. If O2 sats on room air while exercising are 88% or below, continue to step 3.      3) Patient's O2 Sat while exercising on O2: N/A at N/A LPM         (Must show improvement from #2 for patients to qualify)    If O2 sats improve on oxygen, patient qualifies for portable oxygen. If not, the patient does not qualify.

## 2024-01-01 NOTE — SUBJECTIVE & OBJECTIVE
Interval History: awake and alert, still having coughing distress  Oxygen saturation 95% on 2 L nasal cannula-Continue supplemental oxygen-wean as tolerated- ambulatory pulse ox. Weaned to RA  Wbc down trending- and azithromycin.  Continue cough medication       Review of Systems   Constitutional:  Positive for fatigue. Negative for appetite change, chills and fever.   Respiratory:  Positive for cough. Negative for choking and shortness of breath.    Cardiovascular:  Negative for chest pain and leg swelling.   Gastrointestinal:  Negative for anal bleeding and blood in stool.   Genitourinary:  Negative for flank pain and hematuria.   Musculoskeletal:  Negative for back pain and gait problem.   Skin:  Negative for pallor and rash.   Allergic/Immunologic: Negative for food allergies and immunocompromised state.   Neurological:  Negative for seizures and speech difficulty.   Hematological:  Negative for adenopathy. Does not bruise/bleed easily.   Psychiatric/Behavioral:  Negative for decreased concentration and dysphoric mood.      Objective:     Vital Signs (Most Recent):  Temp: 98.5 °F (36.9 °C) (01/01/24 0747)  Pulse: 83 (01/01/24 0747)  Resp: 18 (01/01/24 0747)  BP: (!) 142/73 (01/01/24 0747)  SpO2: 95 % (01/01/24 0747) Vital Signs (24h Range):  Temp:  [98.1 °F (36.7 °C)-99.7 °F (37.6 °C)] 98.5 °F (36.9 °C)  Pulse:  [70-94] 83  Resp:  [17-18] 18  SpO2:  [92 %-96 %] 95 %  BP: (132-151)/(73-91) 142/73     Weight: 77.1 kg (170 lb)  Body mass index is 28.29 kg/m².  No intake or output data in the 24 hours ending 01/01/24 0800        Physical Exam  Vitals reviewed.   Constitutional:       General: She is not in acute distress.     Appearance: She is not toxic-appearing.   HENT:      Mouth/Throat:      Pharynx: No oropharyngeal exudate or posterior oropharyngeal erythema.   Eyes:      General:         Right eye: No discharge.         Left eye: No discharge.   Cardiovascular:      Rate and Rhythm: Normal rate.      Heart  "sounds: No murmur heard.     No gallop.   Pulmonary:      Effort: No respiratory distress.      Breath sounds: No rhonchi or rales.   Abdominal:      General: There is no distension.      Tenderness: There is no abdominal tenderness.   Musculoskeletal:         General: No swelling or deformity.      Cervical back: Normal range of motion. No rigidity.   Lymphadenopathy:      Cervical: No cervical adenopathy.   Skin:     Coloration: Skin is not jaundiced.      Findings: No bruising.   Neurological:      General: No focal deficit present.      Cranial Nerves: No cranial nerve deficit.      Motor: No weakness.   Psychiatric:         Mood and Affect: Mood normal.           Significant Labs: A1C: No results for input(s): "HGBA1C" in the last 4320 hours.  ABGs: No results for input(s): "PH", "PCO2", "HCO3", "POCSATURATED", "BE", "TOTALHB", "COHB", "METHB", "O2HB", "POCFIO2", "PO2" in the last 48 hours.  Blood Culture:   No results for input(s): "LABBLOO" in the last 48 hours.    CBC:   Recent Labs   Lab 12/31/23  0307 01/01/24  0303   WBC 14.15* 10.20   HGB 12.1 12.1   HCT 35.4* 35.6*   * 594*       CMP:   Recent Labs   Lab 12/31/23  0307 01/01/24  0303    138   K 3.5 3.9    100   CO2 26 26   * 118*   BUN 5* 6*   CREATININE 0.7 0.7   CALCIUM 9.6 9.5   ANIONGAP 11 12       Cardiac Markers: No results for input(s): "CKMB", "MYOGLOBIN", "BNP", "TROPISTAT" in the last 48 hours.  Lactic Acid:   No results for input(s): "LACTATE" in the last 48 hours.    Lipase: No results for input(s): "LIPASE" in the last 48 hours.  Lipid Panel: No results for input(s): "CHOL", "HDL", "LDLCALC", "TRIG", "CHOLHDL" in the last 48 hours.  Magnesium:   No results for input(s): "MG" in the last 48 hours.    Troponin:   No results for input(s): "TROPONINI", "TROPONINIHS" in the last 48 hours.    TSH: No results for input(s): "TSH" in the last 4320 hours.  Urine Culture: No results for input(s): "LABURIN" in the last 48 " "hours.  Urine Studies:   No results for input(s): "COLORU", "APPEARANCEUA", "PHUR", "SPECGRAV", "PROTEINUA", "GLUCUA", "KETONESU", "BILIRUBINUA", "OCCULTUA", "NITRITE", "UROBILINOGEN", "LEUKOCYTESUR", "RBCUA", "WBCUA", "BACTERIA", "SQUAMEPITHEL", "HYALINECASTS" in the last 48 hours.    Invalid input(s): "WRIGHTSUR"      Significant Imaging: I have reviewed all pertinent imaging results/findings within the past 24 hours.  "

## 2024-01-01 NOTE — PLAN OF CARE
VN note: Care plan, orders and labs reviewed.    Problem: Adult Inpatient Plan of Care  Goal: Plan of Care Review  Outcome: Ongoing, Progressing     Problem: Adult Inpatient Plan of Care  Goal: Patient-Specific Goal (Individualized)  Outcome: Ongoing, Progressing

## 2024-01-01 NOTE — PROGRESS NOTES
[facility] Medicine  Progress Note    Patient Name: Bri Park  MRN: [id number]  Patient Class: IP- Inpatient   Admission Date: 12/29/2023  Length of Stay: 3 days  Attending Physician: Brenda Hutchison*  Primary Care Provider: Margaret Hawthorne NP        Subjective:     Principal Problem:Influenza B        HPI:  Bri Park is a 73-year-old female with a past medical history of hypertension who presents with flu-like symptoms.    Patient states that she has had productive cough over the past few days with associated body aches, fever, generalized weakness and shortness of breath.  She states that her symptoms initially started around 12/16 and she was evaluated in the ED on 12/19 with negative influenza, COVID, strep pharyngitis tests.  Other imaging was unremarkable.  She was discharged with steroids and albuterol.  She returns today with worsening of her symptoms.    Triage vitals were significant for tachycardia, hypertension, tachypnea, slight hypoxia.  Physical exam was significant for tachycardia, tachypnea, rales.    CBC was significant for leukocytosis, thrombocytosis.  CMP was significant for slight hyponatremia.  Pro BNP, troponin, lactate was unremarkable.  Patient became positive for influenza B.    UA was fairly unremarkable for leukocytes although she was noted to have occult blood.  Blood cultures were pending.    Chest x-ray showed right lower lobe infiltrate consistent with pneumonia.    Patient was given a dose of IV antibiotics.    Patient admitted for influenza and possible bacterial pneumonia.    Overview/Hospital Course:  No notes on file    Interval History: awake and alert, still having coughing distress  Oxygen saturation 95% on 2 L nasal cannula-Continue supplemental oxygen-wean as tolerated- ambulatory pulse ox. Weaned to RA  Wbc down trending- and azithromycin.  Continue cough medication       Review of Systems   Constitutional:  Positive for fatigue.  Negative for appetite change, chills and fever.   Respiratory:  Positive for cough. Negative for choking and shortness of breath.    Cardiovascular:  Negative for chest pain and leg swelling.   Gastrointestinal:  Negative for anal bleeding and blood in stool.   Genitourinary:  Negative for flank pain and hematuria.   Musculoskeletal:  Negative for back pain and gait problem.   Skin:  Negative for pallor and rash.   Allergic/Immunologic: Negative for food allergies and immunocompromised state.   Neurological:  Negative for seizures and speech difficulty.   Hematological:  Negative for adenopathy. Does not bruise/bleed easily.   Psychiatric/Behavioral:  Negative for decreased concentration and dysphoric mood.      Objective:     Vital Signs (Most Recent):  Temp: 98.5 °F (36.9 °C) (01/01/24 0747)  Pulse: 83 (01/01/24 0747)  Resp: 18 (01/01/24 0747)  BP: (!) 142/73 (01/01/24 0747)  SpO2: 95 % (01/01/24 0747) Vital Signs (24h Range):  Temp:  [98.1 °F (36.7 °C)-99.7 °F (37.6 °C)] 98.5 °F (36.9 °C)  Pulse:  [70-94] 83  Resp:  [17-18] 18  SpO2:  [92 %-96 %] 95 %  BP: (132-151)/(73-91) 142/73     Weight: 77.1 kg (170 lb)  Body mass index is 28.29 kg/m².  No intake or output data in the 24 hours ending 01/01/24 0800        Physical Exam  Vitals reviewed.   Constitutional:       General: She is not in acute distress.     Appearance: She is not toxic-appearing.   HENT:      Mouth/Throat:      Pharynx: No oropharyngeal exudate or posterior oropharyngeal erythema.   Eyes:      General:         Right eye: No discharge.         Left eye: No discharge.   Cardiovascular:      Rate and Rhythm: Normal rate.      Heart sounds: No murmur heard.     No gallop.   Pulmonary:      Effort: No respiratory distress.      Breath sounds: No rhonchi or rales.   Abdominal:      General: There is no distension.      Tenderness: There is no abdominal tenderness.   Musculoskeletal:         General: No swelling or deformity.      Cervical back: Normal  "range of motion. No rigidity.   Lymphadenopathy:      Cervical: No cervical adenopathy.   Skin:     Coloration: Skin is not jaundiced.      Findings: No bruising.   Neurological:      General: No focal deficit present.      Cranial Nerves: No cranial nerve deficit.      Motor: No weakness.   Psychiatric:         Mood and Affect: Mood normal.           Significant Labs: A1C: No results for input(s): "HGBA1C" in the last 4320 hours.  ABGs: No results for input(s): "PH", "PCO2", "HCO3", "POCSATURATED", "BE", "TOTALHB", "COHB", "METHB", "O2HB", "POCFIO2", "PO2" in the last 48 hours.  Blood Culture:   No results for input(s): "LABBLOO" in the last 48 hours.    CBC:   Recent Labs   Lab 12/31/23 0307 01/01/24  0303   WBC 14.15* 10.20   HGB 12.1 12.1   HCT 35.4* 35.6*   * 594*       CMP:   Recent Labs   Lab 12/31/23 0307 01/01/24  0303    138   K 3.5 3.9    100   CO2 26 26   * 118*   BUN 5* 6*   CREATININE 0.7 0.7   CALCIUM 9.6 9.5   ANIONGAP 11 12       Cardiac Markers: No results for input(s): "CKMB", "MYOGLOBIN", "BNP", "TROPISTAT" in the last 48 hours.  Lactic Acid:   No results for input(s): "LACTATE" in the last 48 hours.    Lipase: No results for input(s): "LIPASE" in the last 48 hours.  Lipid Panel: No results for input(s): "CHOL", "HDL", "LDLCALC", "TRIG", "CHOLHDL" in the last 48 hours.  Magnesium:   No results for input(s): "MG" in the last 48 hours.    Troponin:   No results for input(s): "TROPONINI", "TROPONINIHS" in the last 48 hours.    TSH: No results for input(s): "TSH" in the last 4320 hours.  Urine Culture: No results for input(s): "LABURIN" in the last 48 hours.  Urine Studies:   No results for input(s): "COLORU", "APPEARANCEUA", "PHUR", "SPECGRAV", "PROTEINUA", "GLUCUA", "KETONESU", "BILIRUBINUA", "OCCULTUA", "NITRITE", "UROBILINOGEN", "LEUKOCYTESUR", "RBCUA", "WBCUA", "BACTERIA", "SQUAMEPITHEL", "HYALINECASTS" in the last 48 hours.    Invalid input(s): " ""WRIGHTSUR"      Significant Imaging: I have reviewed all pertinent imaging results/findings within the past 24 hours.    Assessment/Plan:      * Influenza B  Patient positive for influenza B    Plan:  Continue with Tamiflu for 5 day total course      Hyponatremia  Patient has hyponatremia which is controlled,We will aim to correct the sodium by 4-6mEq in 24 hours. We will monitor sodium Daily. The hyponatremia is due to Dehydration/hypovolemia. We will obtain the following studies:  We will hold off. We will treat the hyponatremia with IV fluids as follows:  1 L. The patient's sodium results have been reviewed and are listed below.  Recent Labs   Lab 01/01/24  0303            Thrombocytosis  Likely acute inflammatory marker      Pneumonia  Chest x-ray shows right lobe hazy opacity consistent with pneumonia  Leukocytosis likely is due to steroids although may have component of infection    Plan:  Continue antibiotics  Continue oxygen as needed  Continue p.r.n. antitussives        HTN (hypertension)  Chronic, controlled. Latest blood pressure and vitals reviewed-     Temp:  [96.2 °F (35.7 °C)-100.9 °F (38.3 °C)]   Pulse:  []   Resp:  [14-33]   BP: (140-201)/(71-91)   SpO2:  [89 %-98 %] .   Home meds for hypertension were reviewed and noted below.   Hypertension Medications               lisinopriL-hydrochlorothiazide (PRINZIDE,ZESTORETIC) 20-12.5 mg per tablet Take 1 tablet by mouth once daily.    metoprolol tartrate (LOPRESSOR) 50 MG tablet Take 1 tablet (50 mg total) by mouth 2 (two) times daily.            While in the hospital, will manage blood pressure as follows; Continue home antihypertensive regimen    Will utilize p.r.n. blood pressure medication only if patient's blood pressure greater than 180/110 and she develops symptoms such as worsening chest pain or shortness of breath.      VTE Risk Mitigation (From admission, onward)           Ordered     heparin (porcine) injection 5,000 Units  Every 8 " hours         12/29/23 1734     IP VTE HIGH RISK PATIENT  Once         12/29/23 1734     Place sequential compression device  Until discontinued         12/29/23 1734                    Discharge Planning   JCARLOS: 1/1/2024     Code Status: Full Code   Is the patient medically ready for discharge?:     Reason for patient still in hospital (select all that apply): Pending disposition  Discharge Plan A: Home with family                  Brenda Hutchison MD  Department of [facility] Medicine   [place] [facility]

## 2024-01-01 NOTE — ASSESSMENT & PLAN NOTE
Patient has hyponatremia which is controlled,We will aim to correct the sodium by 4-6mEq in 24 hours. We will monitor sodium Daily. The hyponatremia is due to Dehydration/hypovolemia. We will obtain the following studies:  We will hold off. We will treat the hyponatremia with IV fluids as follows:  1 L. The patient's sodium results have been reviewed and are listed below.  Recent Labs   Lab 01/01/24  0303

## 2024-01-01 NOTE — DISCHARGE SUMMARY
[facility] Medicine  Discharge Summary      Patient Name: rBi Park  MRN: [id number]  DOMENICA: [id number]  Patient Class: IP- Inpatient  Admission Date: 12/29/2023  Hospital Length of Stay: 3 days  Discharge Date and Time: 1/1/2024  4:34 PM  Attending Physician: Brenda Hutchison*   Discharging Provider: Brenda Hutchison MD  Primary Care Provider: Margaret Hawthorne NP    Primary Care Team: Networked reference to record PCT     HPI:   Bri Park is a 73-year-old female with a past medical history of hypertension who presents with flu-like symptoms.    Patient states that she has had productive cough over the past few days with associated body aches, fever, generalized weakness and shortness of breath.  She states that her symptoms initially started around 12/16 and she was evaluated in the ED on 12/19 with negative influenza, COVID, strep pharyngitis tests.  Other imaging was unremarkable.  She was discharged with steroids and albuterol.  She returns today with worsening of her symptoms.    Triage vitals were significant for tachycardia, hypertension, tachypnea, slight hypoxia.  Physical exam was significant for tachycardia, tachypnea, rales.    CBC was significant for leukocytosis, thrombocytosis.  CMP was significant for slight hyponatremia.  Pro BNP, troponin, lactate was unremarkable.  Patient became positive for influenza B.    UA was fairly unremarkable for leukocytes although she was noted to have occult blood.  Blood cultures were pending.    Chest x-ray showed right lower lobe infiltrate consistent with pneumonia.    Patient was given a dose of IV antibiotics.    Patient admitted for influenza and possible bacterial pneumonia.    * No surgery found *      Hospital Course:   No notes on file     Goals of Care Treatment Preferences:  Code Status: Full Code      Consults:     Pulmonary  Pneumonia  Chest x-ray shows right lobe hazy opacity consistent with  pneumonia  Leukocytosis likely is due to steroids although may have component of infection    Plan:  Continue antibiotics  Continue oxygen as needed  Continue p.r.n. antitussives        Cardiac/Vascular  HTN (hypertension)  Chronic, controlled. Latest blood pressure and vitals reviewed-     Temp:  [98.1 °F (36.7 °C)-99.7 °F (37.6 °C)]   Pulse:  [70-94]   Resp:  [17-18]   BP: (132-151)/(73-91)   SpO2:  [92 %-97 %] .   Home meds for hypertension were reviewed and noted below.   Hypertension Medications               lisinopriL-hydrochlorothiazide (PRINZIDE,ZESTORETIC) 20-12.5 mg per tablet Take 1 tablet by mouth once daily.    metoprolol tartrate (LOPRESSOR) 50 MG tablet Take 1 tablet (50 mg total) by mouth 2 (two) times daily.            While in the hospital, will manage blood pressure as follows; Continue home antihypertensive regimen    Will utilize p.r.n. blood pressure medication only if patient's blood pressure greater than 180/110 and she develops symptoms such as worsening chest pain or shortness of breath.    Renal/  Hyponatremia  Patient has hyponatremia which is controlled,We will aim to correct the sodium by 4-6mEq in 24 hours. We will monitor sodium Daily. The hyponatremia is due to Dehydration/hypovolemia. We will obtain the following studies:  We will hold off. We will treat the hyponatremia with IV fluids as follows:  1 L. The patient's sodium results have been reviewed and are listed below.  Recent Labs   Lab 01/01/24  0303            ID  * Influenza B  Patient positive for influenza B    Plan:  Continue with Tamiflu for 5 day total course      Oncology  Thrombocytosis  Likely acute inflammatory marker        Final Active Diagnoses:    Diagnosis Date Noted POA    PRINCIPAL PROBLEM:  Influenza B [J10.1] 12/29/2023 Yes    Pneumonia [J18.9] 12/29/2023 Yes    Thrombocytosis [D75.839] 12/29/2023 Yes    Hyponatremia [E87.1] 12/29/2023 Yes    HTN (hypertension) [I10] 07/07/2014 Yes      Problems  Resolved During this Admission:       Discharged Condition: stable    Disposition:     Follow Up:   Follow-up Information       Margaret Hawthorne NP Follow up on 1/8/2024.    Specialty: Internal Medicine  Why: at 1:00pm; PCP hospital follow up appointment  Contact information:  [address]  [place] [address]  [phone]                           Patient Instructions:   No discharge procedures on file.    Significant Diagnostic Studies:     Pending Diagnostic Studies:       None           Medications:  Reconciled Home Medications:      Medication List        START taking these medications      azithromycin 500 MG tablet  Commonly known as: ZITHROMAX  Take 1 tablet (500 mg total) by mouth once daily.     benzonatate 100 MG capsule  Commonly known as: TESSALON  Take 1 capsule (100 mg total) by mouth 3 (three) times daily as needed for Cough.     guaiFENesin 100 mg/5 ml 100 mg/5 mL syrup  Commonly known as: ROBITUSSIN  Take 10 mLs (200 mg total) by mouth every 4 (four) hours as needed for Congestion.     oseltamivir 75 MG capsule  Commonly known as: TAMIFLU  Take 1 capsule (75 mg total) by mouth 2 (two) times daily. for 2 days     polyethylene glycol 17 gram Pwpk  Commonly known as: GLYCOLAX  Take 17 g by mouth once daily.  Start taking on: January 2, 2024            CONTINUE taking these medications      albuterol 90 mcg/actuation inhaler  Commonly known as: PROVENTIL/VENTOLIN HFA  Inhale 1-2 puffs into the lungs every 6 (six) hours as needed for Wheezing or Shortness of Breath. Rescue     aspirin 81 MG EC tablet  Commonly known as: ECOTRIN  Take 81 mg by mouth once daily.     latanoprost 0.005 % ophthalmic solution  Commonly known as: XALATAN  Place 1 drop into both eyes once daily.     levothyroxine 75 MCG tablet  Commonly known as: EUTHYROX  Take 1 tablet (75 mcg total) by mouth before breakfast.     lisinopriL-hydrochlorothiazide 20-12.5 mg per tablet  Commonly known as: PRINZIDE,ZESTORETIC  Take 1 tablet  by mouth once daily.     metoprolol tartrate 50 MG tablet  Commonly known as: LOPRESSOR  Take 1 tablet (50 mg total) by mouth 2 (two) times daily.            STOP taking these medications      SUTAB 1.479-0.188- 0.225 gram tablet  Generic drug: sod sulf-pot chloride-mag sulf              Indwelling Lines/Drains at time of discharge:   Lines/Drains/Airways       None                   Time spent on the discharge of patient: 35 minutes         Brenda Hutchison MD  Department of Hospital Medicine  [facility]

## 2024-01-01 NOTE — PLAN OF CARE
Patient lives with her adult daughter, Delicia Park ([phone]), is independent of all ADLs, & denied the need for assistance with transportation at time of discharge.  Pt does not qualify for home O2 per eval at this time.       01/01/24 1526   Final Note   Assessment Type Final Discharge Note   Anticipated Discharge Disposition Home   Hospital Resources/Appts/Education Provided Post-Acute resouces added to AVS;Community resources provided   Post-Acute Status   Discharge Delays None known at this time     Sabra Purcell [facility] Social Work  [phone]

## 2024-01-01 NOTE — ASSESSMENT & PLAN NOTE
Chronic, controlled. Latest blood pressure and vitals reviewed-     Temp:  [98.1 °F (36.7 °C)-99.7 °F (37.6 °C)]   Pulse:  [70-94]   Resp:  [17-18]   BP: (132-151)/(73-91)   SpO2:  [92 %-97 %] .   Home meds for hypertension were reviewed and noted below.   Hypertension Medications               lisinopriL-hydrochlorothiazide (PRINZIDE,ZESTORETIC) 20-12.5 mg per tablet Take 1 tablet by mouth once daily.    metoprolol tartrate (LOPRESSOR) 50 MG tablet Take 1 tablet (50 mg total) by mouth 2 (two) times daily.            While in the hospital, will manage blood pressure as follows; Continue home antihypertensive regimen    Will utilize p.r.n. blood pressure medication only if patient's blood pressure greater than 180/110 and she develops symptoms such as worsening chest pain or shortness of breath.

## 2024-01-03 LAB
BACTERIA BLD CULT: NORMAL
BACTERIA BLD CULT: NORMAL

## 2024-04-01 PROBLEM — J18.9 PNEUMONIA: Status: RESOLVED | Noted: 2023-12-29 | Resolved: 2024-04-01

## 2024-08-21 ENCOUNTER — PATIENT OUTREACH (OUTPATIENT)
Dept: ADMINISTRATIVE | Facility: HOSPITAL | Age: 74
End: 2024-08-21
Payer: MEDICAID

## 2024-08-21 DIAGNOSIS — Z12.31 ENCOUNTER FOR SCREENING MAMMOGRAM FOR BREAST CANCER: Primary | ICD-10-CM

## 2024-08-23 ENCOUNTER — TELEPHONE (OUTPATIENT)
Dept: ADMINISTRATIVE | Facility: HOSPITAL | Age: 74
End: 2024-08-23
Payer: MEDICAID

## 2024-10-14 NOTE — HPI
Bri Park is a 73-year-old female with a past medical history of hypertension who presents with flu-like symptoms.    Patient states that she has had productive cough over the past few days with associated body aches, fever, generalized weakness and shortness of breath.  She states that her symptoms initially started around 12/16 and she was evaluated in the ED on 12/19 with negative influenza, COVID, strep pharyngitis tests.  Other imaging was unremarkable.  She was discharged with steroids and albuterol.  She returns today with worsening of her symptoms.    Triage vitals were significant for tachycardia, hypertension, tachypnea, slight hypoxia.  Physical exam was significant for tachycardia, tachypnea, rales.    CBC was significant for leukocytosis, thrombocytosis.  CMP was significant for slight hyponatremia.  Pro BNP, troponin, lactate was unremarkable.  Patient became positive for influenza B.    UA was fairly unremarkable for leukocytes although she was noted to have occult blood.  Blood cultures were pending.    Chest x-ray showed right lower lobe infiltrate consistent with pneumonia.    Patient was given a dose of IV antibiotics.    Patient admitted for influenza and possible bacterial pneumonia.  
Physical Therapy

## 2024-11-26 ENCOUNTER — PATIENT MESSAGE (OUTPATIENT)
Dept: ADMINISTRATIVE | Facility: HOSPITAL | Age: 74
End: 2024-11-26
Payer: MEDICAID

## 2024-12-23 ENCOUNTER — PATIENT MESSAGE (OUTPATIENT)
Dept: ADMINISTRATIVE | Facility: HOSPITAL | Age: 74
End: 2024-12-23
Payer: MEDICAID

## 2025-02-20 ENCOUNTER — HOSPITAL ENCOUNTER (EMERGENCY)
Facility: HOSPITAL | Age: 75
Discharge: HOME | End: 2025-02-20
Attending: STUDENT IN AN ORGANIZED HEALTH CARE EDUCATION/TRAINING PROGRAM
Payer: MEDICARE

## 2025-02-20 VITALS
RESPIRATION RATE: 18 BRPM | BODY MASS INDEX: 29.75 KG/M2 | HEIGHT: 65 IN | WEIGHT: 178.56 LBS | OXYGEN SATURATION: 98 % | SYSTOLIC BLOOD PRESSURE: 147 MMHG | HEART RATE: 96 BPM | DIASTOLIC BLOOD PRESSURE: 84 MMHG | TEMPERATURE: 98 F

## 2025-02-20 DIAGNOSIS — U07.1 COVID-19 VIRUS DETECTED: ICD-10-CM

## 2025-02-20 DIAGNOSIS — R07.89 RIGHT-SIDED CHEST WALL PAIN: ICD-10-CM

## 2025-02-20 DIAGNOSIS — R05.9 COUGH: ICD-10-CM

## 2025-02-20 DIAGNOSIS — U07.1 COVID: Primary | ICD-10-CM

## 2025-02-20 LAB
INFLUENZA A, MOLECULAR: NEGATIVE
INFLUENZA B, MOLECULAR: NEGATIVE
SARS-COV-2 RDRP RESP QL NAA+PROBE: POSITIVE
SPECIMEN SOURCE: NORMAL

## 2025-02-20 PROCEDURE — 99284 EMERGENCY DEPT VISIT MOD MDM: CPT | Mod: 25,ER

## 2025-02-20 PROCEDURE — 25000003 PHARM REV CODE 250: Mod: ER | Performed by: STUDENT IN AN ORGANIZED HEALTH CARE EDUCATION/TRAINING PROGRAM

## 2025-02-20 PROCEDURE — 99900035 HC TECH TIME PER 15 MIN (STAT): Mod: ER

## 2025-02-20 PROCEDURE — 93005 ELECTROCARDIOGRAM TRACING: CPT | Mod: ER

## 2025-02-20 PROCEDURE — 87502 INFLUENZA DNA AMP PROBE: CPT | Mod: ER | Performed by: STUDENT IN AN ORGANIZED HEALTH CARE EDUCATION/TRAINING PROGRAM

## 2025-02-20 PROCEDURE — 87635 SARS-COV-2 COVID-19 AMP PRB: CPT | Mod: ER | Performed by: STUDENT IN AN ORGANIZED HEALTH CARE EDUCATION/TRAINING PROGRAM

## 2025-02-20 PROCEDURE — 93010 ELECTROCARDIOGRAM REPORT: CPT | Mod: ,,, | Performed by: STUDENT IN AN ORGANIZED HEALTH CARE EDUCATION/TRAINING PROGRAM

## 2025-02-20 RX ORDER — NIRMATRELVIR AND RITONAVIR 300-100 MG
KIT ORAL
Qty: 5 TABLET | Refills: 0 | Status: SHIPPED | OUTPATIENT
Start: 2025-02-20

## 2025-02-20 RX ORDER — BENZONATATE 100 MG/1
200 CAPSULE ORAL
Status: COMPLETED | OUTPATIENT
Start: 2025-02-20 | End: 2025-02-20

## 2025-02-20 RX ORDER — BENZONATATE 100 MG/1
100 CAPSULE ORAL 3 TIMES DAILY PRN
Qty: 10 CAPSULE | Refills: 0 | Status: SHIPPED | OUTPATIENT
Start: 2025-02-20 | End: 2025-03-02

## 2025-02-20 RX ADMIN — BENZONATATE 200 MG: 100 CAPSULE ORAL at 12:02

## 2025-02-21 NOTE — ED PROVIDER NOTES
NAME:  Bri Park  CSN:     [id number]  MRN:    [id number]  ADMIT DATE: 2025        eMERGENCY dEPARTMENT eNCOUnter    CHIEF COMPLAINT    Chief Complaint   Patient presents with    Cough     Cough, sore throat, ear pain that started Tuesday. SOB that started yesterday       HPI    Bri Park is a 74 y.o. female with a past medical history of  has a past medical history of Arthritis, Cataract, Dry eye, and Hypertension.     she presents to the ED due to 2 days of cough.  Notes soreness under the right breast that is worse with coughing.  Thought she would feel improved by now but she has not.  No known sick contacts.  No fevers.  Has not tried anything for her symptoms thus far.      HPI       PAST MEDICAL HISTORY  Past Medical History:   Diagnosis Date    Arthritis     Cataract     Dry eye     Hypertension        SURGICAL HISTORY    Past Surgical History:   Procedure Laterality Date    ANTERIOR CRUCIATE LIGAMENT REPAIR      APPENDECTOMY      CATARACT EXTRACTION Right      SECTION      HYSTERECTOMY      d/t uterine fibroids.    KNEE ARTHROSCOPY Right 06/15/2017    per pt    OOPHORECTOMY Right        FAMILY HISTORY    Family History   Problem Relation Name Age of Onset    Arthritis Mother      Diabetes Mother      Heart disease Mother      Hyperlipidemia Mother      Cancer Father          Colon    Colon cancer Father      Diabetes Sister      Heart disease Sister      Hyperlipidemia Sister         SOCIAL HISTORY    Social History     Socioeconomic History    Marital status:     Years of education: 15   Tobacco Use    Smoking status: Never    Smokeless tobacco: Never   Substance and Sexual Activity    Alcohol use: No     Alcohol/week: 0.0 standard drinks of alcohol    Drug use: No    Sexual activity: Never     Social Drivers of Health     Financial Resource Strain: Low Risk  (2023)    Overall Financial Resource Strain (CARDIA)     Difficulty of Paying Living Expenses: Not very  hard   Food Insecurity: No Food Insecurity (12/30/2023)    Hunger Vital Sign     Worried About Running Out of Food in the Last Year: Never true     Ran Out of Food in the Last Year: Never true   Transportation Needs: No Transportation Needs (12/30/2023)    PRAPARE - Transportation     Lack of Transportation (Medical): No     Lack of Transportation (Non-Medical): No   Physical Activity: Inactive (12/30/2023)    Exercise Vital Sign     Days of Exercise per Week: 0 days     Minutes of Exercise per Session: 0 min   Stress: No Stress Concern Present (12/30/2023)    [facility] - Occupational Stress Questionnaire     Feeling of Stress : Not at all   Housing Stability: Unknown (12/30/2023)    Housing Stability Vital Sign     Unable to Pay for Housing in the Last Year: No     Unstable Housing in the Last Year: No       MEDICATIONS  Current Outpatient Medications   Medication Instructions    albuterol (PROVENTIL/VENTOLIN HFA) 90 mcg/actuation inhaler 1-2 puffs, Inhalation, Every 6 hours PRN, Rescue    aspirin (ECOTRIN) 81 mg, Daily    benzonatate (TESSALON) 100 mg, Oral, 3 times daily PRN    latanoprost (XALATAN) 0.005 % ophthalmic solution 1 drop, Both Eyes, Nightly    levothyroxine (EUTHYROX) 75 mcg, Oral, Before breakfast    lisinopriL-hydrochlorothiazide (PRINZIDE,ZESTORETIC) 20-12.5 mg per tablet 1 tablet, Oral, Daily    metoprolol tartrate (LOPRESSOR) 50 mg, Oral, 2 times daily    nirmatrelvir-ritonavir (PAXLOVID) 300 mg (150 mg x 2)-100 mg copackaged tablets (EUA) Take 3 tablets by mouth 2 (two) times daily. Each dose contains 2 nirmatrelvir (pink tablets) and 1 ritonavir (white tablet). Take all 3 tablets together       ALLERGIES    Review of patient's allergies indicates:   Allergen Reactions    Codeine sulfate Hives    Darvocet a500 [propoxyphene n-acetaminophen] Other (See Comments)    Darvon [propoxyphene] Other (See Comments)     hallucinations    Sulfa (sulfonamide antibiotics)  "Hives    Sulfamethoxazole-trimethoprim          REVIEW OF SYSTEMS   Review of Systems       PHYSICAL EXAM    Reviewed Triage Note    VITAL SIGNS:   ED Triage Vitals [02/20/25 1113]   Encounter Vitals Group      BP (!) 147/81      Systolic BP Percentile       Diastolic BP Percentile       Pulse 98      Resp 20      Temp 98.2 °F (36.8 °C)      Temp Source Oral      SpO2 98 %      Weight 178 lb 9.2 oz      Height 5' 5"      Head Circumference       Peak Flow       Pain Score       Pain Loc       Pain Education       Exclude from Growth Chart        Patient Vitals for the past 24 hrs:   BP Temp Temp src Pulse Resp SpO2 Height Weight   02/20/25 1350 (!) 147/84 98.3 °F (36.8 °C) Oral 96 18 98 % -- --   02/20/25 1113 (!) 147/81 98.2 °F (36.8 °C) Oral 98 20 98 % 5' 5" (1.651 m) 81 kg (178 lb 9.2 oz)       Physical Exam    Nursing note and vitals reviewed.  Constitutional: She appears well-developed and well-nourished.   HENT:   Head: Normocephalic and atraumatic.   No tonsillar exudate. Uvula midline. Tolerating secretions.  Normal TMs bilaterally.  Trace effusion to left TM without any erythema or bulging of the TM   Eyes: EOM are normal. Pupils are equal, round, and reactive to light.   Neck: Neck supple.   Normal range of motion.  Cardiovascular:  Normal rate and regular rhythm.           Pulmonary/Chest: Breath sounds normal. No respiratory distress. She has no wheezes. She has no rhonchi. She has no rales.   Frequent cough during exam   Abdominal: Abdomen is soft. There is no abdominal tenderness.   Musculoskeletal:         General: Normal range of motion.      Cervical back: Normal range of motion and neck supple.     Neurological: She is alert and oriented to person, place, and time.   Skin: Skin is warm and dry.   Psychiatric: She has a normal mood and affect.          EKG     Interpreted by EM physician if performed:   Sinus rhythm at a rate of 91.  Right bundle-branch block is present.  No evidence of acute " ischemia.  Unchanged from previous EKG            LABS  Pertinent labs reviewed. (See chart for details)   Labs Reviewed   SARS-COV-2 RNA AMPLIFICATION, QUAL - Abnormal       Result Value    SARS-CoV-2 RNA, Amplification, Qual Positive (*)    INFLUENZA A & B BY MOLECULAR    Influenza A, Molecular Negative      Influenza B, Molecular Negative      Flu A & B Source Nasal swab           RADIOLOGY          Imaging Results              X-Ray Chest PA And Lateral (Final result)  Result time 02/20/25 12:08:16      Final result by Trey Mohamud MD (02/20/25 12:08:16)                   Impression:      No acute findings.      Electronically signed by: Trey Mohamud MD  Date:    02/20/2025  Time:    12:08               Narrative:    EXAMINATION:  XR CHEST PA AND LATERAL    CLINICAL HISTORY  Right chest wall pain,    COMPARISON:  12/29/2023 x-ray    FINDINGS:  The heart size is normal.  The lung fields are clear.  No acute cardiopulmonary infiltrate.  Multilevel thoracic spondylosis.                                        PROCEDURES    Procedures      ED COURSE & MEDICAL DECISION MAKING    Pertinent Labs & Imaging studies reviewed. (See chart for details and specific orders.)          Summary of review of records:       Medical Decision Making  Amount and/or Complexity of Data Reviewed  Labs:  Decision-making details documented in ED Course.  Radiology: ordered. Decision-making details documented in ED Course.    Risk  Prescription drug management.      Bri Park is a 74 y.o. female who presents with 2 days of cough and congestion with some chest wall pain.    Differential includes but is not limited to viral illness, pneumonia, clinically low suspicion for ACS or pulmonary embolism as she has significant nasal congestion and this does seem more infectious in nature.            Medications   benzonatate capsule 200 mg (200 mg Oral Given 2/20/25 1215)       ED Course as of 02/20/25 1814   Thu Feb 20, 2025    1302 SARS-CoV-2 RNA, Amplification, Qual(!): Positive [HL]   1302 Influenza A, Molecular: Negative [HL]   1302 Influenza B, Molecular: Negative [HL]   1302 X-Ray Chest PA And Lateral  No acute findings. [HL]      ED Course User Index  [HL] Shelly Chase DO         No acute emergent medical condition has been identified. The patient appears to be low risk for an emergent medical condition is appropriate for discharge with outpatient f/u as detailed in discharge instructions for reevaluation and possible continued outpatient workup and management. I have discussed the workup with the patient, who has verbalized understanding of the plan and need for outpatient follow-up.  This evaluation does not preclude the development of an emergent condition so in addition, I have advised the patient that they can return to the ED at any time with worsening or change of their symptoms, or with any other medical complaint.       FINAL IMPRESSION    Final diagnoses:  [R05.9] Cough  [R07.89] Right-sided chest wall pain  [U07.1] COVID (Primary)       DISPOSITION  Patient discharge in stable condition        ED Prescriptions       Medication Sig Dispense Start Date End Date Auth. Provider    nirmatrelvir-ritonavir (PAXLOVID) 300 mg (150 mg x 2)-100 mg copackaged tablets (EUA) Take 3 tablets by mouth 2 (two) times daily. Each dose contains 2 nirmatrelvir (pink tablets) and 1 ritonavir (white tablet). Take all 3 tablets together 5 tablet 2/20/2025 -- Shelly Chase DO    benzonatate (TESSALON) 100 MG capsule Take 1 capsule (100 mg total) by mouth 3 (three) times daily as needed for Cough. 10 capsule 2/20/2025 3/2/2025 Shelly Chase DO          Follow-up Information       Follow up With Specialties Details Why Contact Info    Margaret Hawthorne, NP Internal Medicine Schedule an appointment as soon as possible for a visit   [address] [place] [address]  [phone]      [facility] - Emergency Dept Emergency Medicine  As  needed, If symptoms worsen [address] [facility]  Emergency Department  [facility] [id number] [phone]              DISCLAIMER: This note was prepared with [employer] voice recognition transcription software. Garbled syntax, mangled pronouns, and other bizarre constructions may be attributed to that software system.             Shelly Chase,   02/22/25 0230       Shelly Chase, DO  02/22/25 0231

## 2025-02-24 LAB
OHS QRS DURATION: 130 MS
OHS QTC CALCULATION: 482 MS

## 2025-03-31 ENCOUNTER — PATIENT MESSAGE (OUTPATIENT)
Dept: ADMINISTRATIVE | Facility: HOSPITAL | Age: 75
End: 2025-03-31
Payer: MEDICARE